# Patient Record
Sex: MALE | Race: WHITE | URBAN - METROPOLITAN AREA
[De-identification: names, ages, dates, MRNs, and addresses within clinical notes are randomized per-mention and may not be internally consistent; named-entity substitution may affect disease eponyms.]

---

## 2023-12-20 ENCOUNTER — INITIAL CONSULT (OUTPATIENT)
Dept: BARIATRICS/WEIGHT MGMT | Age: 58
End: 2023-12-20
Payer: COMMERCIAL

## 2023-12-20 VITALS
DIASTOLIC BLOOD PRESSURE: 95 MMHG | BODY MASS INDEX: 28.45 KG/M2 | WEIGHT: 177 LBS | HEART RATE: 75 BPM | SYSTOLIC BLOOD PRESSURE: 154 MMHG | HEIGHT: 66 IN

## 2023-12-20 DIAGNOSIS — K43.0 INCISIONAL HERNIA, INCARCERATED: Primary | ICD-10-CM

## 2023-12-20 PROCEDURE — 99204 OFFICE O/P NEW MOD 45 MIN: CPT | Performed by: SURGERY

## 2023-12-21 ENCOUNTER — PREP FOR PROCEDURE (OUTPATIENT)
Dept: SURGERY | Age: 58
End: 2023-12-21

## 2023-12-21 RX ORDER — LEVOFLOXACIN 5 MG/ML
500 INJECTION, SOLUTION INTRAVENOUS
OUTPATIENT
Start: 2024-02-26 | End: 2024-02-26

## 2023-12-21 RX ORDER — ACETAMINOPHEN 325 MG/1
1000 TABLET ORAL ONCE
OUTPATIENT
Start: 2023-12-21 | End: 2023-12-21

## 2023-12-21 RX ORDER — SODIUM CHLORIDE 0.9 % (FLUSH) 0.9 %
5-40 SYRINGE (ML) INJECTION PRN
OUTPATIENT
Start: 2023-12-21

## 2023-12-21 RX ORDER — SODIUM CHLORIDE 9 MG/ML
INJECTION, SOLUTION INTRAVENOUS PRN
OUTPATIENT
Start: 2023-12-21

## 2023-12-21 RX ORDER — METRONIDAZOLE 500 MG/100ML
500 INJECTION, SOLUTION INTRAVENOUS
OUTPATIENT
Start: 2024-02-26 | End: 2024-02-26

## 2023-12-21 RX ORDER — ENOXAPARIN SODIUM 100 MG/ML
40 INJECTION SUBCUTANEOUS ONCE
OUTPATIENT
Start: 2023-12-21 | End: 2023-12-21

## 2023-12-21 RX ORDER — SODIUM CHLORIDE 0.9 % (FLUSH) 0.9 %
5-40 SYRINGE (ML) INJECTION EVERY 12 HOURS SCHEDULED
OUTPATIENT
Start: 2023-12-21

## 2024-01-12 ENCOUNTER — TELEPHONE (OUTPATIENT)
Dept: BARIATRICS/WEIGHT MGMT | Age: 59
End: 2024-01-12

## 2024-01-12 NOTE — TELEPHONE ENCOUNTER
Called pt to inquire if he is available for surgery on 2/19/24 and if able to stop Remicade 6 weeks prior and post op.    Left message for pt to return a call to the office.

## 2024-01-12 NOTE — TELEPHONE ENCOUNTER
Pt returned call, update pt phone number is chart.    Pt stated that he is still currently on Remicade, he last does will be on 1/19/24. Looks like that will not be enough time to have been completed for the 2/19/24 proposed surgery date. Did let pt know that a message will be sent to provider and surgery schedule to see if they have another date.  Pt understood and will wait for follow up call.      Please advise.

## 2024-01-17 NOTE — TELEPHONE ENCOUNTER
Pt called back to confirm he is available for surgery on this date.  Pt stated last dose of Remicade will be 1/19/24.

## 2024-01-18 RX ORDER — METRONIDAZOLE 500 MG/1
TABLET ORAL
Qty: 3 TABLET | Refills: 0 | Status: SHIPPED | OUTPATIENT
Start: 2024-01-18

## 2024-01-18 RX ORDER — NEOMYCIN SULFATE 500 MG/1
TABLET ORAL
Qty: 6 TABLET | Refills: 0 | Status: SHIPPED | OUTPATIENT
Start: 2024-01-18

## 2024-01-18 RX ORDER — ONDANSETRON 4 MG/1
4 TABLET, ORALLY DISINTEGRATING ORAL 3 TIMES DAILY PRN
Qty: 3 TABLET | Refills: 0 | Status: SHIPPED | OUTPATIENT
Start: 2024-01-18

## 2024-01-18 NOTE — TELEPHONE ENCOUNTER
Patient has been scheduled for:    Procedure:Robotic versus laparoscopic redo ileocecectomy, possible open   Date:2/26/24  Time:10:30 AM  Arrival:8:30 AM  [facility]  [facility] None  Prep? 2 Day prep/ Prep 2, reviewed on phone and emailed instructions     Pre-op?PCP    Post-op Appt? 3/12/24 at 10:30 AM     Patient advised they will need a .    Orders faxed to surgery scheduling.    Instructions have been mailed/emailed to: [email or web address].

## 2024-01-30 ENCOUNTER — TELEPHONE (OUTPATIENT)
Dept: BARIATRICS/WEIGHT MGMT | Age: 59
End: 2024-01-30

## 2024-01-30 NOTE — TELEPHONE ENCOUNTER
Patient has a combo surgery with Ana Laura  2/26/24 - and lvm asking about prep for this procedure.  Please advise patient     ROBOTIC VERSUS LAPAROSCOPIC REDO ILEOCECECTOMY, POSSIBLE OPEN (HIGHER THAN NORMAL LIKELIHOOD) [[id number]]  CYSTOSCOPY BILATERAL STENT INSERTION [[id number]]  . [[id number]]  ROBOTIC INCISIONAL HERNIA REPAIR WITH BIOSYNTHETIC MESH, POSSIBLE OPEN [[id number]]

## 2024-01-31 NOTE — TELEPHONE ENCOUNTER
Left vm to confirm patient received email with prep instructions from Dr. Weiner. Instructions were reviewed on emailed to patient 1/18/24. Asked patient call office back to confirm prep instructions were received.

## 2024-02-01 NOTE — TELEPHONE ENCOUNTER
Spoke with patient. He did receive email with prep instructions. It was overlooked in his email. He now has everything he needs.

## 2024-02-13 ENCOUNTER — TELEPHONE (OUTPATIENT)
Dept: SURGERY | Age: 59
End: 2024-02-13

## 2024-02-13 NOTE — TELEPHONE ENCOUNTER
Patient called to check the status of his [place] paperwork. He would like for it to be sent back to him when it is complete.

## 2024-02-14 NOTE — TELEPHONE ENCOUNTER
[facility] paperwork completed and faxed to employer [phone], emailed to [email or web address].

## 2024-02-23 ENCOUNTER — ANESTHESIA EVENT (OUTPATIENT)
Dept: OPERATING ROOM | Age: 59
DRG: 330 | End: 2024-02-23
Payer: COMMERCIAL

## 2024-02-23 ENCOUNTER — TELEPHONE (OUTPATIENT)
Dept: SURGERY | Age: 59
End: 2024-02-23

## 2024-02-23 NOTE — TELEPHONE ENCOUNTER
Spoke with patient to confirm surgery on 2/26. Arrival time 8:30 am, 2 day prep of clear liquids, npo after midnight. Patient is going to start with some Miralax Friday night as he has an issue of not always being cleaned out enough.

## 2024-02-23 NOTE — PROGRESS NOTES
[facility] PRE-SURGICAL TESTING INSTRUCTIONS                      PRIOR TO PROCEDURE DATE:    1. PLEASE FOLLOW ANY INSTRUCTIONS GIVEN TO YOU PER YOUR SURGEON.      2. Arrange for someone to drive you home and be with you for the first 24 hours after discharge for your safety after your procedure for which you received sedation. Ensure it is someone we can share information with regarding your discharge.     NOTE: At this time ONLY 2 ADULTS may accompany you   One person ENCOURAGED to stay at hospital entire time if outpatient surgery      3. You must contact your surgeon for instructions IF:  You are taking any blood thinners, aspirin, anti-inflammatory or vitamins.  There is a change in your physical condition such as a cold, fever, rash, cuts, sores, or any other infection, especially near your surgical site.    4. Do not drink alcohol the day before or day of your procedure.  Do not use any recreational marijuana at least 24 hours or street drugs (heroin, cocaine) at minimum 5 days prior to your procedure.     5. A Pre-Surgical History and Physical MUST be completed WITHIN 30 DAYS OR LESS prior to your procedure.by your Physician or an Urgent Care        THE DAY OF YOUR PROCEDURE:  1.  Follow instructions for ARRIVAL TIME as DIRECTED BY YOUR SURGEON.     2. Enter the MAIN entrance from [facility] and follow the signs to the free Parking Garage or  Parking (offered free of charge 7 am-5pm).      3. Enter the Main Entrance of the hospital (do not enter from the lower level of the parking garage). Upon entrance, check in with the  at the surgical information desk on your LEFT.   Bring your insurance card and photo ID to register      4. DO NOT EAT ANYTHING 8 hours prior to arrival for surgery.  You may have up to 8 ounces of water 4 hours prior to your arrival for surgery.   NOTE: ALL Gastric, Bariatric & Bowel surgery patients - you MUST follow your surgeon's instructions regarding  you on the day of your procedure.    10. If you use oxygen at home, please bring your oxygen tank with you to hospital..     11. We recommend that valuable personal belongings such as cash, cell phones, e-tablets, or jewelry, be left at home during your stay. The hospital will not be responsible for valuables that are not secured in the hospital safe. However, if your insurance requires a co-pay, you may want to bring a method of payment, i.e., Check or credit card, if you wish to pay your co-pay the day of surgery.      12. If you are to stay overnight, you may bring a bag with personal items. Please have any large items you may need brought in by your family after your arrival to your hospital room.    13. If you have a Living Will or Durable Power of , please bring a copy on the day of your procedure.     How we keep you safe and work to prevent surgical site infections:   1. Health care workers should always check your ID bracelet to verify your name and birth date. You will be asked many times to state your name, date of birth, and allergies.    2. Health care workers should always clean their hands with soap or alcohol gel before providing care to you. It is okay to ask anyone if they cleaned their hands before they touch you.    3. You will be actively involved in verifying the type of procedure you are having and ensuring the correct surgical site. This will be confirmed multiple times prior to your procedure. Do NOT rich your surgery site UNLESS instructed to by your surgeon.     4. When you are in the operating room, your surgical site will be cleansed with a special soap, and in most cases, you will be given an antibiotic before the surgery begins.      What to expect AFTER your procedure?  1. Immediately following your procedure, your will be taken to the PACU for the first phase of your recovery.  Your nurse will help you recover from any potential side effects of anesthesia, such as extreme

## 2024-02-24 NOTE — ANESTHESIA PRE PROCEDURE
Department of Anesthesiology  Preprocedure Note       Name:  Richard Lyman   Age:  59 y.o.  :  1965                                          MRN:  [id number]         Date:  2024      Surgeon: Surgeon(s):  Mathieu Weiner MD Szabo, Daniel, MD Khan, Yasir, DO Szabo, Daniel, MD Barrat, Cory D, MD Khan, Yasir, DO    Procedure: Procedure(s):  ROBOTIC VERSUS LAPAROSCOPIC REDO ILEOCECECTOMY, POSSIBLE OPEN (HIGHER THAN NORMAL LIKELIHOOD)  CYSTOSCOPY BILATERAL STENT INSERTION  .  ROBOTIC INCISIONAL HERNIA REPAIR WITH BIOSYNTHETIC MESH, POSSIBLE OPEN    Medications prior to admission:   Prior to Admission medications    Medication Sig Start Date End Date Taking? Authorizing Provider   metroNIDAZOLE (FLAGYL) 500 MG tablet Take one tablet by mouth 3 times on the day prior to surgery. Take one tablet at 1pm, 2pm and 9pm. 24   Mathieu Weiner MD   neomycin (MYCIFRADIN) 500 MG tablet Take two tablets 3 times the day before surgery. Take two tablets at 1pm, two tablets at 2pm and two tablets at 9pm. 24   Mathieu Weiner MD   ondansetron (ZOFRAN-ODT) 4 MG disintegrating tablet Place 1 tablet under the tongue 3 times daily as needed for Nausea or Vomiting 24   Mathieu Weiner MD   PARoxetine (PAXIL) 10 MG tablet Take 1 tablet by mouth nightly 21   Damari Myrick MD   Colesevelam HCl (WELCHOL PO) Take by mouth PRN    Damari Myrick MD   InFLIXimab (REMICADE IV) Infuse 1 each intravenously Once every 6 weeks Every 8 wks actually    Damari Myrick MD       Current medications:    No current facility-administered medications for this encounter.     Current Outpatient Medications   Medication Sig Dispense Refill    metroNIDAZOLE (FLAGYL) 500 MG tablet Take one tablet by mouth 3 times on the day prior to surgery. Take one tablet at 1pm, 2pm and 9pm. 3 tablet 0    neomycin (MYCIFRADIN) 500 MG tablet Take two tablets 3 times the day before surgery. Take two tablets at 1pm, two  MCV 98.7 05/05/2017 04:00 PM    RDW 13.8 05/05/2017 04:00 PM     05/05/2017 04:00 PM       CMP: No results found for: \"NA\", \"K\", \"CL\", \"CO2\", \"BUN\", \"CREATININE\", \"GFRAA\", \"AGRATIO\", \"LABGLOM\", \"GLUCOSE\", \"GLU\", \"PROT\", \"CALCIUM\", \"BILITOT\", \"ALKPHOS\", \"AST\", \"ALT\"    POC Tests: No results for input(s): \"POCGLU\", \"POCNA\", \"POCK\", \"POCCL\", \"POCBUN\", \"POCHEMO\", \"POCHCT\" in the last 72 hours.    Coags: No results found for: \"PROTIME\", \"INR\", \"APTT\"    HCG (If Applicable): No results found for: \"PREGTESTUR\", \"PREGSERUM\", \"HCG\", \"HCGQUANT\"     ABGs: No results found for: \"PHART\", \"PO2ART\", \"NGS3OIS\", \"IZL1YID\", \"BEART\", \"P8NFPMTD\"     Type & Screen (If Applicable):  No results found for: \"LABABO\", \"LABRH\"    Drug/Infectious Status (If Applicable):  No results found for: \"HIV\", \"HEPCAB\"    COVID-19 Screening (If Applicable): No results found for: \"COVID19\"        Anesthesia Evaluation    Airway: Mallampati: II          Dental:          Pulmonary:normal exam  breath sounds clear to auscultation                             Cardiovascular:          ECG reviewed  Rhythm: regular  Rate: normal                    Neuro/Psych:               GI/Hepatic/Renal:            ROS comment: Crohns.   Endo/Other:                     Abdominal: normal exam            Vascular:          Other Findings:             Anesthesia Plan      general     ASA 2       Induction: intravenous.    MIPS: Postoperative opioids intended and Prophylactic antiemetics administered.  Anesthetic plan and risks discussed with patient.    Use of blood products discussed with patient whom.   Plan discussed with CRNA.    Attending anesthesiologist reviewed and agrees with Preprocedure content                Rosalba Gomez MD   2/24/2024

## 2024-02-26 ENCOUNTER — HOSPITAL ENCOUNTER (INPATIENT)
Age: 59
LOS: 4 days | Discharge: HOME | DRG: 330 | End: 2024-03-01
Attending: SURGERY | Admitting: SURGERY
Payer: COMMERCIAL

## 2024-02-26 ENCOUNTER — ANESTHESIA (OUTPATIENT)
Dept: OPERATING ROOM | Age: 59
DRG: 330 | End: 2024-02-26
Payer: COMMERCIAL

## 2024-02-26 DIAGNOSIS — K50.919 CROHN'S DISEASE WITH COMPLICATION, UNSPECIFIED GASTROINTESTINAL TRACT LOCATION (HCC): ICD-10-CM

## 2024-02-26 DIAGNOSIS — G89.18 ACUTE POST-OPERATIVE PAIN: Primary | ICD-10-CM

## 2024-02-26 DIAGNOSIS — K43.0 INCARCERATED INCISIONAL HERNIA: ICD-10-CM

## 2024-02-26 DIAGNOSIS — K91.89 ILEOCOLIC ANASTOMOTIC LEAK: ICD-10-CM

## 2024-02-26 DIAGNOSIS — N13.2 HYDRONEPHROSIS WITH RENAL AND URETERAL CALCULUS OBSTRUCTION: ICD-10-CM

## 2024-02-26 PROBLEM — Q43.8: Status: ACTIVE | Noted: 2024-02-26

## 2024-02-26 PROBLEM — K50.119 CROHN'S DISEASE OF COLON WITH COMPLICATION (HCC): Status: ACTIVE | Noted: 2024-02-26

## 2024-02-26 LAB
ABO + RH BLD: NORMAL
ALBUMIN SERPL-MCNC: 2.9 G/DL (ref 3.4–5)
ANION GAP SERPL CALCULATED.3IONS-SCNC: 11 MMOL/L (ref 3–16)
ANION GAP SERPL CALCULATED.3IONS-SCNC: 12 MMOL/L (ref 3–16)
BASOPHILS # BLD: 0 K/UL (ref 0–0.2)
BASOPHILS NFR BLD: 0.1 %
BLD GP AB SCN SERPL QL: NORMAL
BUN SERPL-MCNC: 10 MG/DL (ref 7–20)
BUN SERPL-MCNC: 11 MG/DL (ref 7–20)
CALCIUM SERPL-MCNC: 7.6 MG/DL (ref 8.3–10.6)
CALCIUM SERPL-MCNC: 9.5 MG/DL (ref 8.3–10.6)
CHLORIDE SERPL-SCNC: 100 MMOL/L (ref 99–110)
CHLORIDE SERPL-SCNC: 103 MMOL/L (ref 99–110)
CO2 SERPL-SCNC: 21 MMOL/L (ref 21–32)
CO2 SERPL-SCNC: 26 MMOL/L (ref 21–32)
CREAT SERPL-MCNC: 1.1 MG/DL (ref 0.9–1.3)
CREAT SERPL-MCNC: 1.5 MG/DL (ref 0.9–1.3)
DEPRECATED RDW RBC AUTO: 13.7 % (ref 12.4–15.4)
DEPRECATED RDW RBC AUTO: 13.7 % (ref 12.4–15.4)
EOSINOPHIL # BLD: 0 K/UL (ref 0–0.6)
EOSINOPHIL NFR BLD: 0.1 %
GFR SERPLBLD CREATININE-BSD FMLA CKD-EPI: 54 ML/MIN/{1.73_M2}
GFR SERPLBLD CREATININE-BSD FMLA CKD-EPI: >60 ML/MIN/{1.73_M2}
GLUCOSE SERPL-MCNC: 112 MG/DL (ref 70–99)
GLUCOSE SERPL-MCNC: 131 MG/DL (ref 70–99)
HCT VFR BLD AUTO: 38.4 % (ref 40.5–52.5)
HCT VFR BLD AUTO: 48.1 % (ref 40.5–52.5)
HGB BLD-MCNC: 13.5 G/DL (ref 13.5–17.5)
HGB BLD-MCNC: 16.9 G/DL (ref 13.5–17.5)
INR PPP: 1.23 (ref 0.84–1.16)
LACTATE BLDV-SCNC: 1.8 MMOL/L (ref 0.4–2)
LYMPHOCYTES # BLD: 0.9 K/UL (ref 1–5.1)
LYMPHOCYTES NFR BLD: 8.1 %
MAGNESIUM SERPL-MCNC: 1.5 MG/DL (ref 1.8–2.4)
MCH RBC QN AUTO: 32.3 PG (ref 26–34)
MCH RBC QN AUTO: 32.8 PG (ref 26–34)
MCHC RBC AUTO-ENTMCNC: 35.1 G/DL (ref 31–36)
MCHC RBC AUTO-ENTMCNC: 35.2 G/DL (ref 31–36)
MCV RBC AUTO: 92 FL (ref 80–100)
MCV RBC AUTO: 93.4 FL (ref 80–100)
MONOCYTES # BLD: 1.2 K/UL (ref 0–1.3)
MONOCYTES NFR BLD: 10.4 %
NEUTROPHILS # BLD: 9.6 K/UL (ref 1.7–7.7)
NEUTROPHILS NFR BLD: 81.3 %
NT-PROBNP SERPL-MCNC: <36 PG/ML (ref 0–124)
PHOSPHATE SERPL-MCNC: 3 MG/DL (ref 2.5–4.9)
PLATELET # BLD AUTO: 243 K/UL (ref 135–450)
PLATELET # BLD AUTO: 293 K/UL (ref 135–450)
PMV BLD AUTO: 8.1 FL (ref 5–10.5)
PMV BLD AUTO: 8.1 FL (ref 5–10.5)
POTASSIUM SERPL-SCNC: 4 MMOL/L (ref 3.5–5.1)
POTASSIUM SERPL-SCNC: 4.2 MMOL/L (ref 3.5–5.1)
PROTHROMBIN TIME: 15.5 SEC (ref 11.5–14.8)
RBC # BLD AUTO: 4.17 M/UL (ref 4.2–5.9)
RBC # BLD AUTO: 5.15 M/UL (ref 4.2–5.9)
SODIUM SERPL-SCNC: 135 MMOL/L (ref 136–145)
SODIUM SERPL-SCNC: 138 MMOL/L (ref 136–145)
WBC # BLD AUTO: 11.8 K/UL (ref 4–11)
WBC # BLD AUTO: 8.5 K/UL (ref 4–11)

## 2024-02-26 PROCEDURE — 0WQF0ZZ REPAIR ABDOMINAL WALL, OPEN APPROACH: ICD-10-PCS | Performed by: SURGERY

## 2024-02-26 PROCEDURE — 2580000003 HC RX 258

## 2024-02-26 PROCEDURE — 2709999900 HC NON-CHARGEABLE SUPPLY: Performed by: SURGERY

## 2024-02-26 PROCEDURE — 7100000001 HC PACU RECOVERY - ADDTL 15 MIN: Performed by: SURGERY

## 2024-02-26 PROCEDURE — 2500000003 HC RX 250 WO HCPCS

## 2024-02-26 PROCEDURE — 2500000003 HC RX 250 WO HCPCS: Performed by: NURSE ANESTHETIST, CERTIFIED REGISTERED

## 2024-02-26 PROCEDURE — 0DBH0ZZ EXCISION OF CECUM, OPEN APPROACH: ICD-10-PCS | Performed by: SURGERY

## 2024-02-26 PROCEDURE — 88342 IMHCHEM/IMCYTCHM 1ST ANTB: CPT

## 2024-02-26 PROCEDURE — 49596 RPR AA HRN 1ST > 10 NCR/STRN: CPT | Performed by: SURGERY

## 2024-02-26 PROCEDURE — 83735 ASSAY OF MAGNESIUM: CPT

## 2024-02-26 PROCEDURE — 88309 TISSUE EXAM BY PATHOLOGIST: CPT

## 2024-02-26 PROCEDURE — 2580000003 HC RX 258: Performed by: SURGERY

## 2024-02-26 PROCEDURE — 94761 N-INVAS EAR/PLS OXIMETRY MLT: CPT

## 2024-02-26 PROCEDURE — 3600000009 HC SURGERY ROBOT BASE: Performed by: SURGERY

## 2024-02-26 PROCEDURE — C1781 MESH (IMPLANTABLE): HCPCS | Performed by: SURGERY

## 2024-02-26 PROCEDURE — 86850 RBC ANTIBODY SCREEN: CPT

## 2024-02-26 PROCEDURE — 44205 LAP COLECTOMY PART W/ILEUM: CPT | Performed by: SURGERY

## 2024-02-26 PROCEDURE — S2900 ROBOTIC SURGICAL SYSTEM: HCPCS | Performed by: SURGERY

## 2024-02-26 PROCEDURE — 83880 ASSAY OF NATRIURETIC PEPTIDE: CPT

## 2024-02-26 PROCEDURE — 3700000001 HC ADD 15 MINUTES (ANESTHESIA): Performed by: SURGERY

## 2024-02-26 PROCEDURE — C1713 ANCHOR/SCREW BN/BN,TIS/BN: HCPCS | Performed by: SURGERY

## 2024-02-26 PROCEDURE — 83605 ASSAY OF LACTIC ACID: CPT

## 2024-02-26 PROCEDURE — 3600000019 HC SURGERY ROBOT ADDTL 15MIN: Performed by: SURGERY

## 2024-02-26 PROCEDURE — 2580000003 HC RX 258: Performed by: ANESTHESIOLOGY

## 2024-02-26 PROCEDURE — 85027 COMPLETE CBC AUTOMATED: CPT

## 2024-02-26 PROCEDURE — 3700000000 HC ANESTHESIA ATTENDED CARE: Performed by: SURGERY

## 2024-02-26 PROCEDURE — 86900 BLOOD TYPING SEROLOGIC ABO: CPT

## 2024-02-26 PROCEDURE — C9290 INJ, BUPIVACAINE LIPOSOME: HCPCS | Performed by: ANESTHESIOLOGY

## 2024-02-26 PROCEDURE — 6370000000 HC RX 637 (ALT 250 FOR IP): Performed by: SURGERY

## 2024-02-26 PROCEDURE — 2700000000 HC OXYGEN THERAPY PER DAY

## 2024-02-26 PROCEDURE — A4217 STERILE WATER/SALINE, 500 ML: HCPCS | Performed by: SURGERY

## 2024-02-26 PROCEDURE — 88341 IMHCHEM/IMCYTCHM EA ADD ANTB: CPT

## 2024-02-26 PROCEDURE — 6360000002 HC RX W HCPCS

## 2024-02-26 PROCEDURE — 6360000002 HC RX W HCPCS: Performed by: SURGERY

## 2024-02-26 PROCEDURE — 7100000000 HC PACU RECOVERY - FIRST 15 MIN: Performed by: SURGERY

## 2024-02-26 PROCEDURE — 88304 TISSUE EXAM BY PATHOLOGIST: CPT

## 2024-02-26 PROCEDURE — 6370000000 HC RX 637 (ALT 250 FOR IP)

## 2024-02-26 PROCEDURE — 6360000002 HC RX W HCPCS: Performed by: ANESTHESIOLOGY

## 2024-02-26 PROCEDURE — C1758 CATHETER, URETERAL: HCPCS | Performed by: SURGERY

## 2024-02-26 PROCEDURE — 86901 BLOOD TYPING SEROLOGIC RH(D): CPT

## 2024-02-26 PROCEDURE — 80069 RENAL FUNCTION PANEL: CPT

## 2024-02-26 PROCEDURE — 15734 MUSCLE-SKIN GRAFT TRUNK: CPT | Performed by: SURGERY

## 2024-02-26 PROCEDURE — 2720000010 HC SURG SUPPLY STERILE: Performed by: SURGERY

## 2024-02-26 PROCEDURE — 6360000002 HC RX W HCPCS: Performed by: NURSE ANESTHETIST, CERTIFIED REGISTERED

## 2024-02-26 PROCEDURE — C1769 GUIDE WIRE: HCPCS | Performed by: SURGERY

## 2024-02-26 PROCEDURE — 64488 TAP BLOCK BI INJECTION: CPT | Performed by: ANESTHESIOLOGY

## 2024-02-26 PROCEDURE — 85610 PROTHROMBIN TIME: CPT

## 2024-02-26 PROCEDURE — 1200000000 HC SEMI PRIVATE

## 2024-02-26 PROCEDURE — 85025 COMPLETE CBC W/AUTO DIFF WBC: CPT

## 2024-02-26 PROCEDURE — 2580000003 HC RX 258: Performed by: NURSE ANESTHETIST, CERTIFIED REGISTERED

## 2024-02-26 DEVICE — IMPLANTABLE DEVICE: Type: IMPLANTABLE DEVICE | Status: FUNCTIONAL

## 2024-02-26 RX ORDER — LORAZEPAM 2 MG/ML
0.5 INJECTION INTRAMUSCULAR
Status: DISCONTINUED | OUTPATIENT
Start: 2024-02-26 | End: 2024-02-26 | Stop reason: HOSPADM

## 2024-02-26 RX ORDER — SODIUM CHLORIDE 0.9 % (FLUSH) 0.9 %
5-40 SYRINGE (ML) INJECTION EVERY 12 HOURS SCHEDULED
Status: DISCONTINUED | OUTPATIENT
Start: 2024-02-26 | End: 2024-02-26 | Stop reason: HOSPADM

## 2024-02-26 RX ORDER — MAGNESIUM SULFATE IN WATER 40 MG/ML
2000 INJECTION, SOLUTION INTRAVENOUS PRN
Status: DISCONTINUED | OUTPATIENT
Start: 2024-02-26 | End: 2024-03-01 | Stop reason: HOSPADM

## 2024-02-26 RX ORDER — ONDANSETRON 4 MG/1
4 TABLET, ORALLY DISINTEGRATING ORAL EVERY 8 HOURS PRN
Status: DISCONTINUED | OUTPATIENT
Start: 2024-02-26 | End: 2024-03-01 | Stop reason: HOSPADM

## 2024-02-26 RX ORDER — ACETAMINOPHEN 500 MG
1000 TABLET ORAL EVERY 6 HOURS SCHEDULED
Status: DISCONTINUED | OUTPATIENT
Start: 2024-02-26 | End: 2024-03-01 | Stop reason: HOSPADM

## 2024-02-26 RX ORDER — SUCCINYLCHOLINE/SOD CL,ISO/PF 200MG/10ML
SYRINGE (ML) INTRAVENOUS PRN
Status: DISCONTINUED | OUTPATIENT
Start: 2024-02-26 | End: 2024-02-26 | Stop reason: SDUPTHER

## 2024-02-26 RX ORDER — HYDROMORPHONE HYDROCHLORIDE 1 MG/ML
0.5 INJECTION, SOLUTION INTRAMUSCULAR; INTRAVENOUS; SUBCUTANEOUS
Status: DISCONTINUED | OUTPATIENT
Start: 2024-02-26 | End: 2024-02-28

## 2024-02-26 RX ORDER — PAROXETINE 10 MG/1
10 TABLET, FILM COATED ORAL NIGHTLY
Status: DISCONTINUED | OUTPATIENT
Start: 2024-02-26 | End: 2024-03-01 | Stop reason: HOSPADM

## 2024-02-26 RX ORDER — METOCLOPRAMIDE HYDROCHLORIDE 5 MG/ML
10 INJECTION INTRAMUSCULAR; INTRAVENOUS
Status: COMPLETED | OUTPATIENT
Start: 2024-02-26 | End: 2024-02-26

## 2024-02-26 RX ORDER — FENTANYL CITRATE 50 UG/ML
25 INJECTION, SOLUTION INTRAMUSCULAR; INTRAVENOUS EVERY 5 MIN PRN
Status: DISCONTINUED | OUTPATIENT
Start: 2024-02-26 | End: 2024-02-26 | Stop reason: HOSPADM

## 2024-02-26 RX ORDER — MAGNESIUM HYDROXIDE 1200 MG/15ML
LIQUID ORAL CONTINUOUS PRN
Status: DISCONTINUED | OUTPATIENT
Start: 2024-02-26 | End: 2024-02-26 | Stop reason: HOSPADM

## 2024-02-26 RX ORDER — BUPIVACAINE HYDROCHLORIDE 2.5 MG/ML
INJECTION, SOLUTION INFILTRATION; PERINEURAL
Status: COMPLETED | OUTPATIENT
Start: 2024-02-26 | End: 2024-02-26

## 2024-02-26 RX ORDER — SODIUM CHLORIDE 9 MG/ML
INJECTION, SOLUTION INTRAVENOUS PRN
Status: DISCONTINUED | OUTPATIENT
Start: 2024-02-26 | End: 2024-02-26 | Stop reason: HOSPADM

## 2024-02-26 RX ORDER — POTASSIUM CHLORIDE 7.45 MG/ML
10 INJECTION INTRAVENOUS PRN
Status: DISCONTINUED | OUTPATIENT
Start: 2024-02-26 | End: 2024-02-29

## 2024-02-26 RX ORDER — OXYCODONE HYDROCHLORIDE 5 MG/1
5 TABLET ORAL EVERY 4 HOURS PRN
Status: DISCONTINUED | OUTPATIENT
Start: 2024-02-26 | End: 2024-03-01 | Stop reason: HOSPADM

## 2024-02-26 RX ORDER — HYDRALAZINE HYDROCHLORIDE 20 MG/ML
10 INJECTION INTRAMUSCULAR; INTRAVENOUS
Status: DISCONTINUED | OUTPATIENT
Start: 2024-02-26 | End: 2024-02-26 | Stop reason: HOSPADM

## 2024-02-26 RX ORDER — SODIUM CHLORIDE 0.9 % (FLUSH) 0.9 %
5-40 SYRINGE (ML) INJECTION PRN
Status: DISCONTINUED | OUTPATIENT
Start: 2024-02-26 | End: 2024-02-26 | Stop reason: HOSPADM

## 2024-02-26 RX ORDER — HYDROMORPHONE HYDROCHLORIDE 1 MG/ML
0.5 INJECTION, SOLUTION INTRAMUSCULAR; INTRAVENOUS; SUBCUTANEOUS EVERY 5 MIN PRN
Status: DISCONTINUED | OUTPATIENT
Start: 2024-02-26 | End: 2024-02-26 | Stop reason: HOSPADM

## 2024-02-26 RX ORDER — ENOXAPARIN SODIUM 100 MG/ML
40 INJECTION SUBCUTANEOUS DAILY
Status: DISCONTINUED | OUTPATIENT
Start: 2024-02-27 | End: 2024-03-01 | Stop reason: HOSPADM

## 2024-02-26 RX ORDER — ONDANSETRON 2 MG/ML
4 INJECTION INTRAMUSCULAR; INTRAVENOUS
Status: DISCONTINUED | OUTPATIENT
Start: 2024-02-26 | End: 2024-02-26 | Stop reason: HOSPADM

## 2024-02-26 RX ORDER — FENTANYL CITRATE 50 UG/ML
INJECTION, SOLUTION INTRAMUSCULAR; INTRAVENOUS PRN
Status: DISCONTINUED | OUTPATIENT
Start: 2024-02-26 | End: 2024-02-26 | Stop reason: SDUPTHER

## 2024-02-26 RX ORDER — HYDROMORPHONE HYDROCHLORIDE 2 MG/ML
INJECTION, SOLUTION INTRAMUSCULAR; INTRAVENOUS; SUBCUTANEOUS PRN
Status: DISCONTINUED | OUTPATIENT
Start: 2024-02-26 | End: 2024-02-26 | Stop reason: SDUPTHER

## 2024-02-26 RX ORDER — PHENYLEPHRINE HYDROCHLORIDE 10 MG/ML
INJECTION INTRAVENOUS PRN
Status: DISCONTINUED | OUTPATIENT
Start: 2024-02-26 | End: 2024-02-26 | Stop reason: SDUPTHER

## 2024-02-26 RX ORDER — MAGNESIUM SULFATE IN WATER 40 MG/ML
2000 INJECTION, SOLUTION INTRAVENOUS ONCE
Status: COMPLETED | OUTPATIENT
Start: 2024-02-26 | End: 2024-02-26

## 2024-02-26 RX ORDER — SODIUM CHLORIDE 0.9 % (FLUSH) 0.9 %
10 SYRINGE (ML) INJECTION PRN
Status: DISCONTINUED | OUTPATIENT
Start: 2024-02-26 | End: 2024-03-01 | Stop reason: HOSPADM

## 2024-02-26 RX ORDER — ENOXAPARIN SODIUM 100 MG/ML
40 INJECTION SUBCUTANEOUS ONCE
Status: COMPLETED | OUTPATIENT
Start: 2024-02-26 | End: 2024-02-26

## 2024-02-26 RX ORDER — METRONIDAZOLE 500 MG/100ML
500 INJECTION, SOLUTION INTRAVENOUS
Status: COMPLETED | OUTPATIENT
Start: 2024-02-26 | End: 2024-02-26

## 2024-02-26 RX ORDER — METHOCARBAMOL 750 MG/1
1500 TABLET, FILM COATED ORAL 4 TIMES DAILY
Status: DISCONTINUED | OUTPATIENT
Start: 2024-02-26 | End: 2024-02-29

## 2024-02-26 RX ORDER — ONDANSETRON 2 MG/ML
INJECTION INTRAMUSCULAR; INTRAVENOUS PRN
Status: DISCONTINUED | OUTPATIENT
Start: 2024-02-26 | End: 2024-02-26 | Stop reason: SDUPTHER

## 2024-02-26 RX ORDER — DIPHENHYDRAMINE HYDROCHLORIDE 50 MG/ML
12.5 INJECTION INTRAMUSCULAR; INTRAVENOUS
Status: DISCONTINUED | OUTPATIENT
Start: 2024-02-26 | End: 2024-03-01 | Stop reason: HOSPADM

## 2024-02-26 RX ORDER — LEVOFLOXACIN 5 MG/ML
500 INJECTION, SOLUTION INTRAVENOUS
Status: COMPLETED | OUTPATIENT
Start: 2024-02-26 | End: 2024-02-26

## 2024-02-26 RX ORDER — SODIUM CHLORIDE 0.9 % (FLUSH) 0.9 %
10 SYRINGE (ML) INJECTION EVERY 12 HOURS SCHEDULED
Status: DISCONTINUED | OUTPATIENT
Start: 2024-02-26 | End: 2024-03-01 | Stop reason: HOSPADM

## 2024-02-26 RX ORDER — SODIUM CHLORIDE, SODIUM LACTATE, POTASSIUM CHLORIDE, CALCIUM CHLORIDE 600; 310; 30; 20 MG/100ML; MG/100ML; MG/100ML; MG/100ML
INJECTION, SOLUTION INTRAVENOUS CONTINUOUS
Status: DISCONTINUED | OUTPATIENT
Start: 2024-02-26 | End: 2024-02-26 | Stop reason: HOSPADM

## 2024-02-26 RX ORDER — OXYCODONE HYDROCHLORIDE 5 MG/1
10 TABLET ORAL PRN
Status: DISCONTINUED | OUTPATIENT
Start: 2024-02-26 | End: 2024-02-26 | Stop reason: HOSPADM

## 2024-02-26 RX ORDER — PROPOFOL 10 MG/ML
INJECTION, EMULSION INTRAVENOUS PRN
Status: DISCONTINUED | OUTPATIENT
Start: 2024-02-26 | End: 2024-02-26 | Stop reason: SDUPTHER

## 2024-02-26 RX ORDER — DIPHENHYDRAMINE HYDROCHLORIDE 50 MG/ML
INJECTION INTRAMUSCULAR; INTRAVENOUS
Status: COMPLETED
Start: 2024-02-26 | End: 2024-02-26

## 2024-02-26 RX ORDER — PANTOPRAZOLE SODIUM 40 MG/1
40 TABLET, DELAYED RELEASE ORAL DAILY
Status: DISCONTINUED | OUTPATIENT
Start: 2024-02-26 | End: 2024-03-01 | Stop reason: HOSPADM

## 2024-02-26 RX ORDER — OXYCODONE HYDROCHLORIDE 5 MG/1
10 TABLET ORAL EVERY 4 HOURS PRN
Status: DISCONTINUED | OUTPATIENT
Start: 2024-02-26 | End: 2024-03-01 | Stop reason: HOSPADM

## 2024-02-26 RX ORDER — METHOCARBAMOL 100 MG/ML
INJECTION, SOLUTION INTRAMUSCULAR; INTRAVENOUS PRN
Status: DISCONTINUED | OUTPATIENT
Start: 2024-02-26 | End: 2024-02-26 | Stop reason: SDUPTHER

## 2024-02-26 RX ORDER — LIDOCAINE HYDROCHLORIDE 20 MG/ML
INJECTION, SOLUTION INTRAVENOUS PRN
Status: DISCONTINUED | OUTPATIENT
Start: 2024-02-26 | End: 2024-02-26 | Stop reason: SDUPTHER

## 2024-02-26 RX ORDER — SODIUM CHLORIDE 9 MG/ML
INJECTION, SOLUTION INTRAVENOUS CONTINUOUS PRN
Status: DISCONTINUED | OUTPATIENT
Start: 2024-02-26 | End: 2024-02-26 | Stop reason: SDUPTHER

## 2024-02-26 RX ORDER — SODIUM CHLORIDE, SODIUM LACTATE, POTASSIUM CHLORIDE, CALCIUM CHLORIDE 600; 310; 30; 20 MG/100ML; MG/100ML; MG/100ML; MG/100ML
INJECTION, SOLUTION INTRAVENOUS CONTINUOUS
Status: DISCONTINUED | OUTPATIENT
Start: 2024-02-26 | End: 2024-02-27

## 2024-02-26 RX ORDER — ROCURONIUM BROMIDE 10 MG/ML
INJECTION, SOLUTION INTRAVENOUS PRN
Status: DISCONTINUED | OUTPATIENT
Start: 2024-02-26 | End: 2024-02-26 | Stop reason: SDUPTHER

## 2024-02-26 RX ORDER — LABETALOL HYDROCHLORIDE 5 MG/ML
10 INJECTION, SOLUTION INTRAVENOUS
Status: DISCONTINUED | OUTPATIENT
Start: 2024-02-26 | End: 2024-02-26 | Stop reason: HOSPADM

## 2024-02-26 RX ORDER — ONDANSETRON 2 MG/ML
4 INJECTION INTRAMUSCULAR; INTRAVENOUS EVERY 6 HOURS PRN
Status: DISCONTINUED | OUTPATIENT
Start: 2024-02-26 | End: 2024-03-01 | Stop reason: HOSPADM

## 2024-02-26 RX ORDER — MIDAZOLAM HYDROCHLORIDE 1 MG/ML
INJECTION INTRAMUSCULAR; INTRAVENOUS PRN
Status: DISCONTINUED | OUTPATIENT
Start: 2024-02-26 | End: 2024-02-26 | Stop reason: SDUPTHER

## 2024-02-26 RX ORDER — ACETAMINOPHEN 500 MG
1000 TABLET ORAL ONCE
Status: COMPLETED | OUTPATIENT
Start: 2024-02-26 | End: 2024-02-26

## 2024-02-26 RX ORDER — SODIUM CHLORIDE, SODIUM LACTATE, POTASSIUM CHLORIDE, CALCIUM CHLORIDE 600; 310; 30; 20 MG/100ML; MG/100ML; MG/100ML; MG/100ML
INJECTION, SOLUTION INTRAVENOUS CONTINUOUS PRN
Status: DISCONTINUED | OUTPATIENT
Start: 2024-02-26 | End: 2024-02-26 | Stop reason: SDUPTHER

## 2024-02-26 RX ORDER — POTASSIUM CHLORIDE 20 MEQ/1
40 TABLET, EXTENDED RELEASE ORAL PRN
Status: DISCONTINUED | OUTPATIENT
Start: 2024-02-26 | End: 2024-02-29

## 2024-02-26 RX ORDER — OXYCODONE HYDROCHLORIDE 5 MG/1
5 TABLET ORAL PRN
Status: DISCONTINUED | OUTPATIENT
Start: 2024-02-26 | End: 2024-02-26 | Stop reason: HOSPADM

## 2024-02-26 RX ORDER — HYDROMORPHONE HYDROCHLORIDE 1 MG/ML
0.25 INJECTION, SOLUTION INTRAMUSCULAR; INTRAVENOUS; SUBCUTANEOUS
Status: DISCONTINUED | OUTPATIENT
Start: 2024-02-26 | End: 2024-02-28

## 2024-02-26 RX ORDER — SODIUM CHLORIDE 9 MG/ML
INJECTION, SOLUTION INTRAVENOUS PRN
Status: DISCONTINUED | OUTPATIENT
Start: 2024-02-26 | End: 2024-03-01 | Stop reason: HOSPADM

## 2024-02-26 RX ORDER — MEPERIDINE HYDROCHLORIDE 25 MG/ML
12.5 INJECTION INTRAMUSCULAR; INTRAVENOUS; SUBCUTANEOUS EVERY 5 MIN PRN
Status: DISCONTINUED | OUTPATIENT
Start: 2024-02-26 | End: 2024-02-26 | Stop reason: HOSPADM

## 2024-02-26 RX ADMIN — SODIUM CHLORIDE, SODIUM LACTATE, POTASSIUM CHLORIDE, AND CALCIUM CHLORIDE: .6; .31; .03; .02 INJECTION, SOLUTION INTRAVENOUS at 12:16

## 2024-02-26 RX ADMIN — ROCURONIUM BROMIDE 50 MG: 10 INJECTION, SOLUTION INTRAVENOUS at 11:30

## 2024-02-26 RX ADMIN — FENTANYL CITRATE 50 MCG: 50 INJECTION, SOLUTION INTRAMUSCULAR; INTRAVENOUS at 11:23

## 2024-02-26 RX ADMIN — PANTOPRAZOLE SODIUM 40 MG: 40 TABLET, DELAYED RELEASE ORAL at 18:15

## 2024-02-26 RX ADMIN — PHENYLEPHRINE HYDROCHLORIDE 100 MCG: 10 INJECTION, SOLUTION INTRAVENOUS at 12:59

## 2024-02-26 RX ADMIN — FENTANYL CITRATE 25 MCG: 50 INJECTION INTRAMUSCULAR; INTRAVENOUS at 17:00

## 2024-02-26 RX ADMIN — METOCLOPRAMIDE HYDROCHLORIDE 10 MG: 5 INJECTION INTRAMUSCULAR; INTRAVENOUS at 16:59

## 2024-02-26 RX ADMIN — DIPHENHYDRAMINE HYDROCHLORIDE 12.5 MG: 50 INJECTION INTRAMUSCULAR; INTRAVENOUS at 16:56

## 2024-02-26 RX ADMIN — METRONIDAZOLE 500 MG: 500 INJECTION, SOLUTION INTRAVENOUS at 11:03

## 2024-02-26 RX ADMIN — PAROXETINE HYDROCHLORIDE 10 MG: 10 TABLET, FILM COATED ORAL at 21:48

## 2024-02-26 RX ADMIN — PHENYLEPHRINE HYDROCHLORIDE 100 MCG: 10 INJECTION, SOLUTION INTRAVENOUS at 11:50

## 2024-02-26 RX ADMIN — PHENYLEPHRINE HYDROCHLORIDE 100 MCG: 10 INJECTION, SOLUTION INTRAVENOUS at 11:54

## 2024-02-26 RX ADMIN — SODIUM CHLORIDE, SODIUM LACTATE, POTASSIUM CHLORIDE, AND CALCIUM CHLORIDE: .6; .31; .03; .02 INJECTION, SOLUTION INTRAVENOUS at 10:56

## 2024-02-26 RX ADMIN — HYDROMORPHONE HYDROCHLORIDE 0.5 MG: 2 INJECTION, SOLUTION INTRAMUSCULAR; INTRAVENOUS; SUBCUTANEOUS at 14:54

## 2024-02-26 RX ADMIN — SODIUM CHLORIDE, POTASSIUM CHLORIDE, SODIUM LACTATE AND CALCIUM CHLORIDE: 600; 310; 30; 20 INJECTION, SOLUTION INTRAVENOUS at 17:38

## 2024-02-26 RX ADMIN — ACETAMINOPHEN 1000 MG: 500 TABLET ORAL at 21:48

## 2024-02-26 RX ADMIN — ROCURONIUM BROMIDE 45 MG: 10 INJECTION, SOLUTION INTRAVENOUS at 11:05

## 2024-02-26 RX ADMIN — SUGAMMADEX 100 MG: 100 INJECTION, SOLUTION INTRAVENOUS at 16:01

## 2024-02-26 RX ADMIN — MAGNESIUM SULFATE HEPTAHYDRATE 2000 MG: 40 INJECTION, SOLUTION INTRAVENOUS at 18:15

## 2024-02-26 RX ADMIN — PROPOFOL 160 MG: 10 INJECTION, EMULSION INTRAVENOUS at 11:01

## 2024-02-26 RX ADMIN — HYDROMORPHONE HYDROCHLORIDE 0.5 MG: 2 INJECTION, SOLUTION INTRAMUSCULAR; INTRAVENOUS; SUBCUTANEOUS at 15:44

## 2024-02-26 RX ADMIN — PHENYLEPHRINE HYDROCHLORIDE 100 MCG: 10 INJECTION, SOLUTION INTRAVENOUS at 11:15

## 2024-02-26 RX ADMIN — METHOCARBAMOL 1500 MG: 750 TABLET ORAL at 21:48

## 2024-02-26 RX ADMIN — OXYCODONE 10 MG: 5 TABLET ORAL at 20:39

## 2024-02-26 RX ADMIN — METHOCARBAMOL 1000 MG: 100 INJECTION INTRAMUSCULAR; INTRAVENOUS at 11:28

## 2024-02-26 RX ADMIN — BUPIVACAINE HYDROCHLORIDE 40 ML: 2.5 INJECTION, SOLUTION INFILTRATION; PERINEURAL at 15:58

## 2024-02-26 RX ADMIN — SODIUM CHLORIDE, POTASSIUM CHLORIDE, SODIUM LACTATE AND CALCIUM CHLORIDE: 600; 310; 30; 20 INJECTION, SOLUTION INTRAVENOUS at 09:31

## 2024-02-26 RX ADMIN — HYDROMORPHONE HYDROCHLORIDE 0.5 MG: 1 INJECTION, SOLUTION INTRAMUSCULAR; INTRAVENOUS; SUBCUTANEOUS at 17:00

## 2024-02-26 RX ADMIN — SODIUM CHLORIDE 2000 MG: 900 INJECTION INTRAVENOUS at 18:13

## 2024-02-26 RX ADMIN — BUPIVACAINE 20 ML: 13.3 INJECTION, SUSPENSION, LIPOSOMAL INFILTRATION at 15:58

## 2024-02-26 RX ADMIN — DEXMEDETOMIDINE HYDROCHLORIDE 6 MCG: 100 INJECTION, SOLUTION INTRAVENOUS at 11:26

## 2024-02-26 RX ADMIN — LEVOFLOXACIN 500 MG: 5 INJECTION, SOLUTION INTRAVENOUS at 11:12

## 2024-02-26 RX ADMIN — ONDANSETRON 4 MG: 2 INJECTION INTRAMUSCULAR; INTRAVENOUS at 15:57

## 2024-02-26 RX ADMIN — SODIUM CHLORIDE, POTASSIUM CHLORIDE, SODIUM LACTATE AND CALCIUM CHLORIDE: 600; 310; 30; 20 INJECTION, SOLUTION INTRAVENOUS at 22:27

## 2024-02-26 RX ADMIN — ONDANSETRON 4 MG: 2 INJECTION INTRAMUSCULAR; INTRAVENOUS at 11:21

## 2024-02-26 RX ADMIN — LIDOCAINE HYDROCHLORIDE 80 MG: 20 INJECTION, SOLUTION INTRAVENOUS at 11:01

## 2024-02-26 RX ADMIN — Medication 120 MG: at 11:01

## 2024-02-26 RX ADMIN — SODIUM CHLORIDE, POTASSIUM CHLORIDE, SODIUM LACTATE AND CALCIUM CHLORIDE: 600; 310; 30; 20 INJECTION, SOLUTION INTRAVENOUS at 13:15

## 2024-02-26 RX ADMIN — ROCURONIUM BROMIDE 5 MG: 10 INJECTION, SOLUTION INTRAVENOUS at 11:01

## 2024-02-26 RX ADMIN — MIDAZOLAM HYDROCHLORIDE 2 MG: 2 INJECTION, SOLUTION INTRAMUSCULAR; INTRAVENOUS at 10:54

## 2024-02-26 RX ADMIN — FENTANYL CITRATE 50 MCG: 50 INJECTION, SOLUTION INTRAMUSCULAR; INTRAVENOUS at 10:58

## 2024-02-26 RX ADMIN — ROCURONIUM BROMIDE 30 MG: 10 INJECTION, SOLUTION INTRAVENOUS at 12:54

## 2024-02-26 RX ADMIN — SODIUM CHLORIDE: 9 INJECTION, SOLUTION INTRAVENOUS at 12:59

## 2024-02-26 RX ADMIN — HYDROMORPHONE HYDROCHLORIDE 0.5 MG: 2 INJECTION, SOLUTION INTRAMUSCULAR; INTRAVENOUS; SUBCUTANEOUS at 16:13

## 2024-02-26 RX ADMIN — PHENYLEPHRINE HYDROCHLORIDE 100 MCG: 10 INJECTION, SOLUTION INTRAVENOUS at 15:02

## 2024-02-26 RX ADMIN — SUGAMMADEX 100 MG: 100 INJECTION, SOLUTION INTRAVENOUS at 16:02

## 2024-02-26 RX ADMIN — PHENYLEPHRINE HYDROCHLORIDE 100 MCG: 10 INJECTION, SOLUTION INTRAVENOUS at 15:20

## 2024-02-26 RX ADMIN — ROCURONIUM BROMIDE 20 MG: 10 INJECTION, SOLUTION INTRAVENOUS at 14:07

## 2024-02-26 RX ADMIN — METHOCARBAMOL 1500 MG: 750 TABLET ORAL at 18:15

## 2024-02-26 RX ADMIN — ENOXAPARIN SODIUM 40 MG: 100 INJECTION SUBCUTANEOUS at 09:31

## 2024-02-26 RX ADMIN — PROPOFOL 50 MG: 10 INJECTION, EMULSION INTRAVENOUS at 11:25

## 2024-02-26 RX ADMIN — SODIUM CHLORIDE, POTASSIUM CHLORIDE, SODIUM LACTATE AND CALCIUM CHLORIDE: 600; 310; 30; 20 INJECTION, SOLUTION INTRAVENOUS at 12:16

## 2024-02-26 RX ADMIN — ACETAMINOPHEN 1000 MG: 500 TABLET ORAL at 09:32

## 2024-02-26 ASSESSMENT — PAIN DESCRIPTION - ORIENTATION
ORIENTATION: MID
ORIENTATION: MID

## 2024-02-26 ASSESSMENT — PAIN SCALES - GENERAL
PAINLEVEL_OUTOF10: 7
PAINLEVEL_OUTOF10: 7
PAINLEVEL_OUTOF10: 0
PAINLEVEL_OUTOF10: 2
PAINLEVEL_OUTOF10: 4
PAINLEVEL_OUTOF10: 2

## 2024-02-26 ASSESSMENT — PAIN DESCRIPTION - DESCRIPTORS
DESCRIPTORS: DISCOMFORT
DESCRIPTORS: ACHING;SORE

## 2024-02-26 ASSESSMENT — PAIN DESCRIPTION - LOCATION
LOCATION: ABDOMEN

## 2024-02-26 ASSESSMENT — PAIN - FUNCTIONAL ASSESSMENT
PAIN_FUNCTIONAL_ASSESSMENT: ACTIVITIES ARE NOT PREVENTED
PAIN_FUNCTIONAL_ASSESSMENT: ACTIVITIES ARE NOT PREVENTED

## 2024-02-26 ASSESSMENT — PAIN DESCRIPTION - ONSET: ONSET: ON-GOING

## 2024-02-26 ASSESSMENT — PAIN SCALES - WONG BAKER: WONGBAKER_NUMERICALRESPONSE: 0

## 2024-02-26 ASSESSMENT — PAIN DESCRIPTION - PAIN TYPE: TYPE: SURGICAL PAIN

## 2024-02-26 ASSESSMENT — PAIN DESCRIPTION - FREQUENCY: FREQUENCY: CONTINUOUS

## 2024-02-26 NOTE — H&P
PRE-OP/PRE-PROCEDURE H&P    Visit Date: 2/26/2024    History:     Richard Lyman is a 59 y.o. male who presents today for procedure. See my/PCP/oncologist office notes for indications and details.    Patient Active Problem List:     Iron deficiency anemia due to chronic blood loss     Iron malabsorption         Current Facility-Administered Medications:     lactated ringers IV soln infusion, , IntraVENous, Continuous, Clint Wolfe MD    sodium chloride flush 0.9 % injection 5-40 mL, 5-40 mL, IntraVENous, 2 times per day, Mathieu Weiner MD    sodium chloride flush 0.9 % injection 5-40 mL, 5-40 mL, IntraVENous, PRN, Mathieu Weiner MD    0.9 % sodium chloride infusion, , IntraVENous, PRN, Mathieu Weiner MD    metroNIDAZOLE (FLAGYL) 500 mg in 0.9% NaCl 100 mL IVPB premix, 500 mg, IntraVENous, On Call to OR **AND** levoFLOXacin (LEVAQUIN) 500 MG/100ML infusion 500 mg, 500 mg, IntraVENous, On Call to OR, Mathieu Weiner MD    acetaminophen (TYLENOL) tablet 1,000 mg, 1,000 mg, Oral, Once, Mathieu Weiner MD    enoxaparin (LOVENOX) injection 40 mg, 40 mg, SubCUTAneous, Once, Mathieu Wiener MD  Prior to Admission medications    Medication Sig Start Date End Date Taking? Authorizing Provider   metroNIDAZOLE (FLAGYL) 500 MG tablet Take one tablet by mouth 3 times on the day prior to surgery. Take one tablet at 1pm, 2pm and 9pm. 1/18/24   Mathieu Weiner MD   neomycin (MYCIFRADIN) 500 MG tablet Take two tablets 3 times the day before surgery. Take two tablets at 1pm, two tablets at 2pm and two tablets at 9pm. 1/18/24   Mathieu Weiner MD   ondansetron (ZOFRAN-ODT) 4 MG disintegrating tablet Place 1 tablet under the tongue 3 times daily as needed for Nausea or Vomiting 1/18/24   Mathieu Weiner MD   PARoxetine (PAXIL) 10 MG tablet Take 1 tablet by mouth nightly 6/16/21   ProviderDamari MD   Colesevelam HCl (WELCHOL PO) Take by mouth PRN    Provider, MD Damari   InFLIXimab (REMICADE IV) Infuse 1 each

## 2024-02-26 NOTE — BRIEF OP NOTE
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  INCISIONAL HERNA REPAIR WITH RETRORECTUS BIOSYNTHETIC MESH PLACEMENT AND DEBRIDEMET OF ABDOMINAL WALL MEASURING 23x9 CENTIMETERS    Surgeon(s):  Mathieu Weiner MD Barrat, Cory D, MD Khan, Yasir, DO    Assistant:  Surgical Assistant: Marques Cabezas  Resident: Flaca Aceves DO; Nel Suresh MD    Anesthesia: General    Estimated Blood Loss (mL): Minimal    Complications: None    Specimens:   ID Type Source Tests Collected by Time Destination   A : Redo ileocecectomy Tissue Tissue SURGICAL PATHOLOGY Mathieu Weiner MD 2/26/2024 1236    B : Abdominal wall with skin and subcutaneous tissue Tissue Tissue SURGICAL PATHOLOGY Cyrus Samano DO 2/26/2024 1419        Implants:  * No implants in log *      Drains:   Closed/Suction Drain Left Abdomen;LLQ Bulb (Active)       Negative Pressure Wound Therapy Abdomen (Active)       NG/OG/NJ/NE Tube Orogastric 18 fr Center mouth (Active)       Urinary Catheter 02/26/24 Austin (Active)       Findings: multiple adhesions from multiple intraabdominal surgeries and inflammatory Crohn's disease     This procedure was not performed to treat colon cancer through

## 2024-02-26 NOTE — PROGRESS NOTES
Patient Richard Lyman to room 5304 from PACU. Patient is A&O x 4. VSS. Patient oriented to the room all safety measures in place. Patient given IS and SCDs at this time. Admission orders released and patient 4 eyes completed. Admission documentation completed. No other needs are noted at this time.    [x] Bed alarm on and cord plugged into wall  [x] Bed in lowest position  [x] Call light and bedside table within reach  [x] Patient educated on all safety measures  [x]Oxygen connected to wall (if applicable)     Nurse 1 Esignature: Electronically signed by Sundar De La Cruz, RN on 2/26/24 at 6:41 PM EST  Nurse 2 Esignature: Electronically signed by Ashok Gilliam RN on 2/26/24 at 6:41 PM EST

## 2024-02-26 NOTE — PROGRESS NOTES
4 Eyes Skin Assessment     NAME:  Richard Lyman  YOB: 1965  MEDICAL RECORD NUMBER:  [id number]    The patient is being assessed for  Post-Op Surgical    I agree that at least one RN has performed a thorough Head to Toe Skin Assessment on the patient. ALL assessment sites listed below have been assessed.      Areas assessed by both nurses:    Head, Face, Ears, Shoulders, Back, Chest, Arms, Elbows, Hands, Sacrum. Buttock, Coccyx, Ischium, Legs. Feet and Heels, and Under Medical Devices         Does the Patient have a Wound? No noted wound(s)       Carl Prevention initiated by RN: No  Wound Care Orders initiated by RN: No    Pressure Injury (Stage 3,4, Unstageable, DTI, NWPT, and Complex wounds) if present, place Wound referral order by RN under : No    New Ostomies, if present place, Ostomy referral order under : No     Nurse 1 eSignature: Electronically signed by Sundar De La Cruz RN on 2/26/24 at 6:39 PM EST    **SHARE this note so that the co-signing nurse can place an eSignature**    Nurse 2 eSignature: Electronically signed by Ashok Gilliam RN on 2/26/24 at 6:40 PM EST

## 2024-02-26 NOTE — PLAN OF CARE
Problem: Discharge Planning  Goal: Discharge to home or other facility with appropriate resources  Outcome: Progressing  Flowsheets (Taken 2/26/2024 1747)  Discharge to home or other facility with appropriate resources:   Identify barriers to discharge with patient and caregiver   Arrange for needed discharge resources and transportation as appropriate     Problem: Pain  Goal: Verbalizes/displays adequate comfort level or baseline comfort level  Outcome: Progressing  Flowsheets (Taken 2/26/2024 1747)  Verbalizes/displays adequate comfort level or baseline comfort level:   Encourage patient to monitor pain and request assistance   Assess pain using appropriate pain scale     Problem: Safety - Adult  Goal: Free from fall injury  Outcome: Progressing  Flowsheets (Taken 2/26/2024 1747)  Free From Fall Injury: Instruct family/caregiver on patient safety     Problem: ABCDS Injury Assessment  Goal: Absence of physical injury  Outcome: Progressing  Flowsheets (Taken 2/26/2024 1747)  Absence of Physical Injury: Implement safety measures based on patient assessment

## 2024-02-26 NOTE — H&P
Update History & Physical    The patient's History and Physical  was reviewed with the patient and I examined the patient. There was no change. The surgical site was confirmed by the patient and me. Patient and the family had additional questions about the ureter tents placement intra-op. All questions were answered.      Plan: The risks, benefits, expected outcome, and alternative to the recommended procedure have been discussed with the patient. Patient understands and wants to proceed with the procedure.     Electronically signed by Nel Suresh MD on 2/26/2024 at 10:00 AM

## 2024-02-26 NOTE — PROGRESS NOTES
Pt received from OR to PACU # 13 via bed.     Post: Procedure(s):  REDO ILEOCECECTOMY  .  INCISIONAL HERNA REPAIR WITH RETRORECTUS BIOSYNTHETIC MESH PLACEMENT AND DEBRIDEMET OF ABDOMINAL WALL MEASURING 23x9 CENTIMETERS     Report received from OR RNJESUS CRNA, ISIDRO Rivera CRNA and Resident.    Per report pt received over 5L IVF and only had 200 ml urine.    Respirations reg and easy.  Pt is unresponsive.       Attached to PACU monitoring system. Alarms and parameters set    Pain none noted and no complaints of nausea.

## 2024-02-26 NOTE — PROGRESS NOTES
PACU Transfer to 5304    Vitals:    02/26/24 1715   BP: 112/69   Pulse: 85   Resp: 13   Temp: 97.8 °F (36.6 °C)   SpO2: 95%         Intake/Output Summary (Last 24 hours) at 2/26/2024 1721  Last data filed at 2/26/2024 1700  Gross per 24 hour   Intake 5000 ml   Output 590 ml   Net 4410 ml       Pain assessment:  present - adequately treated  Pain Level: 4    Patient transferred via bed per Bo to care of 5S RN.

## 2024-02-26 NOTE — ANESTHESIA PROCEDURE NOTES
Peripheral Block    Patient location during procedure: OR  Reason for block: post-op pain management and at surgeon's request  Start time: 2/26/2024 3:58 PM  End time: 2/26/2024 4:05 PM  Staffing  Performed: anesthesiologist   Anesthesiologist: Xavi Jansen MD  Performed by: Xavi Jansen MD  Authorized by: Rosalba Gomez MD    Preanesthetic Checklist  Completed: patient identified, IV checked, site marked, risks and benefits discussed, surgical/procedural consents, equipment checked, pre-op evaluation, timeout performed, anesthesia consent given, oxygen available, monitors applied/VS acknowledged, fire risk safety assessment completed and verbalized and blood product R/B/A discussed and consented  Peripheral Block   Patient position: supine  Prep: ChloraPrep  Provider prep: mask and sterile gloves  Patient monitoring: cardiac monitor, continuous pulse ox, continuous capnometry, frequent blood pressure checks, IV access, responsive to questions and oxygen  Block type: TAP and Rectus sheath  Laterality: bilateral  Injection technique: single-shot  Guidance: ultrasound guided    Needle   Needle type: insulated echogenic nerve stimulator needle   Needle gauge: 22 G  Needle localization: ultrasound guidance  Needle length: 8 cm  Assessment   Injection assessment: negative aspiration for heme, no paresthesia on injection, local visualized surrounding nerve on ultrasound and no intravascular symptoms  Paresthesia pain: none  Slow fractionated injection: yes  Hemodynamics: stable  Outcomes: uncomplicated and patient tolerated procedure well    Additional Notes  15 ml per block. Bilateral TAP and bilateral rectus sheath. Each block with 10 ml 0.25% bupivacaine + 5 ml exparel. No complications.  Medications Administered  BUPivacaine liposome (EXPAREL) injection 1.3% - Perineural   20 mL - 2/26/2024 3:58:00 PM  BUPivacaine (MARCAINE) injection 0.25% - Perineural   40 mL - 2/26/2024 3:58:00 PM

## 2024-02-27 LAB
ALBUMIN SERPL-MCNC: 3.1 G/DL (ref 3.4–5)
ALBUMIN/GLOB SERPL: 1.1 {RATIO} (ref 1.1–2.2)
ALP SERPL-CCNC: 44 U/L (ref 40–129)
ALT SERPL-CCNC: 21 U/L (ref 10–40)
ANION GAP SERPL CALCULATED.3IONS-SCNC: 12 MMOL/L (ref 3–16)
AST SERPL-CCNC: 23 U/L (ref 15–37)
BASOPHILS # BLD: 0 K/UL (ref 0–0.2)
BASOPHILS NFR BLD: 0.3 %
BILIRUB SERPL-MCNC: 1.1 MG/DL (ref 0–1)
BUN SERPL-MCNC: 11 MG/DL (ref 7–20)
CALCIUM SERPL-MCNC: 7.8 MG/DL (ref 8.3–10.6)
CHLORIDE SERPL-SCNC: 103 MMOL/L (ref 99–110)
CO2 SERPL-SCNC: 21 MMOL/L (ref 21–32)
CREAT SERPL-MCNC: 1.3 MG/DL (ref 0.9–1.3)
DEPRECATED RDW RBC AUTO: 14.2 % (ref 12.4–15.4)
EOSINOPHIL # BLD: 0 K/UL (ref 0–0.6)
EOSINOPHIL NFR BLD: 0 %
GFR SERPLBLD CREATININE-BSD FMLA CKD-EPI: >60 ML/MIN/{1.73_M2}
GLUCOSE SERPL-MCNC: 116 MG/DL (ref 70–99)
HCT VFR BLD AUTO: 39.6 % (ref 40.5–52.5)
HGB BLD-MCNC: 13.7 G/DL (ref 13.5–17.5)
LYMPHOCYTES # BLD: 1.3 K/UL (ref 1–5.1)
LYMPHOCYTES NFR BLD: 11.8 %
MAGNESIUM SERPL-MCNC: 2 MG/DL (ref 1.8–2.4)
MCH RBC QN AUTO: 33.3 PG (ref 26–34)
MCHC RBC AUTO-ENTMCNC: 34.6 G/DL (ref 31–36)
MCV RBC AUTO: 96.3 FL (ref 80–100)
MONOCYTES # BLD: 1.4 K/UL (ref 0–1.3)
MONOCYTES NFR BLD: 13.6 %
NEUTROPHILS # BLD: 7.9 K/UL (ref 1.7–7.7)
NEUTROPHILS NFR BLD: 74.3 %
PHOSPHATE SERPL-MCNC: 3.7 MG/DL (ref 2.5–4.9)
PLATELET # BLD AUTO: 235 K/UL (ref 135–450)
PMV BLD AUTO: 8.5 FL (ref 5–10.5)
POTASSIUM SERPL-SCNC: 4.3 MMOL/L (ref 3.5–5.1)
PROT SERPL-MCNC: 5.8 G/DL (ref 6.4–8.2)
RBC # BLD AUTO: 4.12 M/UL (ref 4.2–5.9)
SODIUM SERPL-SCNC: 136 MMOL/L (ref 136–145)
WBC # BLD AUTO: 10.6 K/UL (ref 4–11)

## 2024-02-27 PROCEDURE — 2580000003 HC RX 258: Performed by: SURGERY

## 2024-02-27 PROCEDURE — 94799 UNLISTED PULMONARY SVC/PX: CPT

## 2024-02-27 PROCEDURE — 36415 COLL VENOUS BLD VENIPUNCTURE: CPT

## 2024-02-27 PROCEDURE — 6370000000 HC RX 637 (ALT 250 FOR IP)

## 2024-02-27 PROCEDURE — 6360000002 HC RX W HCPCS: Performed by: SURGERY

## 2024-02-27 PROCEDURE — 94150 VITAL CAPACITY TEST: CPT

## 2024-02-27 PROCEDURE — 85025 COMPLETE CBC W/AUTO DIFF WBC: CPT

## 2024-02-27 PROCEDURE — 84100 ASSAY OF PHOSPHORUS: CPT

## 2024-02-27 PROCEDURE — 1200000000 HC SEMI PRIVATE

## 2024-02-27 PROCEDURE — 80053 COMPREHEN METABOLIC PANEL: CPT

## 2024-02-27 PROCEDURE — 83735 ASSAY OF MAGNESIUM: CPT

## 2024-02-27 PROCEDURE — 99024 POSTOP FOLLOW-UP VISIT: CPT | Performed by: SURGERY

## 2024-02-27 PROCEDURE — 6370000000 HC RX 637 (ALT 250 FOR IP): Performed by: SURGERY

## 2024-02-27 PROCEDURE — 2580000003 HC RX 258

## 2024-02-27 RX ORDER — SODIUM CHLORIDE, SODIUM LACTATE, POTASSIUM CHLORIDE, CALCIUM CHLORIDE 600; 310; 30; 20 MG/100ML; MG/100ML; MG/100ML; MG/100ML
INJECTION, SOLUTION INTRAVENOUS CONTINUOUS
Status: DISCONTINUED | OUTPATIENT
Start: 2024-02-27 | End: 2024-02-28

## 2024-02-27 RX ADMIN — PANTOPRAZOLE SODIUM 40 MG: 40 TABLET, DELAYED RELEASE ORAL at 08:08

## 2024-02-27 RX ADMIN — OXYCODONE 10 MG: 5 TABLET ORAL at 19:28

## 2024-02-27 RX ADMIN — METHOCARBAMOL 1500 MG: 750 TABLET ORAL at 13:44

## 2024-02-27 RX ADMIN — ACETAMINOPHEN 1000 MG: 500 TABLET ORAL at 12:12

## 2024-02-27 RX ADMIN — SODIUM CHLORIDE, PRESERVATIVE FREE 10 ML: 5 INJECTION INTRAVENOUS at 21:21

## 2024-02-27 RX ADMIN — OXYCODONE 5 MG: 5 TABLET ORAL at 01:23

## 2024-02-27 RX ADMIN — METHOCARBAMOL 1500 MG: 750 TABLET ORAL at 18:09

## 2024-02-27 RX ADMIN — METHOCARBAMOL 1500 MG: 750 TABLET ORAL at 08:08

## 2024-02-27 RX ADMIN — OXYCODONE 10 MG: 5 TABLET ORAL at 12:12

## 2024-02-27 RX ADMIN — SODIUM CHLORIDE 2000 MG: 900 INJECTION INTRAVENOUS at 01:08

## 2024-02-27 RX ADMIN — OXYCODONE 10 MG: 5 TABLET ORAL at 06:53

## 2024-02-27 RX ADMIN — HYDROMORPHONE HYDROCHLORIDE 0.25 MG: 1 INJECTION, SOLUTION INTRAMUSCULAR; INTRAVENOUS; SUBCUTANEOUS at 21:20

## 2024-02-27 RX ADMIN — ACETAMINOPHEN 1000 MG: 500 TABLET ORAL at 06:49

## 2024-02-27 RX ADMIN — ACETAMINOPHEN 1000 MG: 500 TABLET ORAL at 01:23

## 2024-02-27 RX ADMIN — METHOCARBAMOL 1500 MG: 750 TABLET ORAL at 21:19

## 2024-02-27 RX ADMIN — SODIUM CHLORIDE, POTASSIUM CHLORIDE, SODIUM LACTATE AND CALCIUM CHLORIDE: 600; 310; 30; 20 INJECTION, SOLUTION INTRAVENOUS at 06:48

## 2024-02-27 RX ADMIN — ENOXAPARIN SODIUM 40 MG: 100 INJECTION SUBCUTANEOUS at 08:07

## 2024-02-27 RX ADMIN — PAROXETINE HYDROCHLORIDE 10 MG: 10 TABLET, FILM COATED ORAL at 21:20

## 2024-02-27 ASSESSMENT — PAIN DESCRIPTION - ORIENTATION
ORIENTATION: MID

## 2024-02-27 ASSESSMENT — PAIN - FUNCTIONAL ASSESSMENT
PAIN_FUNCTIONAL_ASSESSMENT: PREVENTS OR INTERFERES SOME ACTIVE ACTIVITIES AND ADLS
PAIN_FUNCTIONAL_ASSESSMENT: ACTIVITIES ARE NOT PREVENTED

## 2024-02-27 ASSESSMENT — PAIN SCALES - GENERAL
PAINLEVEL_OUTOF10: 4
PAINLEVEL_OUTOF10: 2
PAINLEVEL_OUTOF10: 5
PAINLEVEL_OUTOF10: 3
PAINLEVEL_OUTOF10: 3
PAINLEVEL_OUTOF10: 4
PAINLEVEL_OUTOF10: 2
PAINLEVEL_OUTOF10: 3
PAINLEVEL_OUTOF10: 5
PAINLEVEL_OUTOF10: 4
PAINLEVEL_OUTOF10: 8
PAINLEVEL_OUTOF10: 2
PAINLEVEL_OUTOF10: 5
PAINLEVEL_OUTOF10: 3
PAINLEVEL_OUTOF10: 7
PAINLEVEL_OUTOF10: 7

## 2024-02-27 ASSESSMENT — PAIN DESCRIPTION - LOCATION
LOCATION: ABDOMEN

## 2024-02-27 ASSESSMENT — PAIN SCALES - WONG BAKER
WONGBAKER_NUMERICALRESPONSE: 2

## 2024-02-27 ASSESSMENT — PAIN DESCRIPTION - PAIN TYPE: TYPE: SURGICAL PAIN

## 2024-02-27 ASSESSMENT — PAIN DESCRIPTION - DESCRIPTORS
DESCRIPTORS: ACHING
DESCRIPTORS: DISCOMFORT
DESCRIPTORS: ACHING;SORE
DESCRIPTORS: SORE
DESCRIPTORS: DISCOMFORT

## 2024-02-27 ASSESSMENT — PAIN DESCRIPTION - FREQUENCY: FREQUENCY: CONTINUOUS

## 2024-02-27 ASSESSMENT — PAIN DESCRIPTION - ONSET: ONSET: ON-GOING

## 2024-02-27 NOTE — PROGRESS NOTES
Norman shepherd @ 0945.     Electronically signed by Sundar De La Cruz RN on 2/27/2024 at 9:47 AM

## 2024-02-27 NOTE — PROGRESS NOTES
Department of Surgery  Daily Progress Note    CC: high grade dysplasia of ileocolic anastomosis    Procedure(s) Performed: redo ileocecetomy, incisional hernia repair with retrorectus biosynthetic mesh placement and debridement of abdominal wall measuring 23x9 cm.     Subjective:   No acute events overnight. Afebrile and hemodynamically stable. Pain fairly well controled. Denies nausea or vomiting. No gas or bowel movement. Ambulated once.     Objective:  Exam:  Vitals:    02/26/24 2230 02/27/24 0123 02/27/24 0153 02/27/24 0432   BP: 114/75   118/82   Pulse: 92   88   Resp: 19 19 19 19   Temp: 97.9 °F (36.6 °C)   98.1 °F (36.7 °C)   TempSrc: Axillary   Oral   SpO2: 92%   94%   Weight:       Height:           General appearance: alert, no acute distress  Chest/Lungs: No adventitious breath sounds. No increased work of breathing.   Cardiovascular: RRR  Abdomen: soft, appropriately tender, non-distended. Prevena with good seal. CAROL drain w/ sanguinoserous output.   : Tyson in place with clear urine  Skin: no erythema or rashes, no cyanosis  Extremities: no edema, no cyanosis  Neuro: A&Ox3, no focal deficits, sensation intact    Assessment and Plan  This is a 59 y.o. year old male s/p redo ileocecetomy, incisional hernia repair with retrorectus biosynthetic mesh placement and debridement of abdominal wall measuring 23x9 cm on 2/26    - Multimodal pain control  - Encourage IS use, OOB to chair and ambulation  - IVFs with LR at 125  - Diet: CLD  - Follow up AM labs  - Abx coverage with Ancef  - Possible tyson removal later this morning  - Dispo pending pain control and tolerance of diet    Jian Pulido DO  PGY-1, General Surgery Resident  02/27/24 6:17 AM  [phone]

## 2024-02-27 NOTE — PROGRESS NOTES
Patient alert and oriented X4.   O2 94% on 2L with NC. Encouraged IS and deep breathing.  Pain managed with scheduled Tylenol, Robaxin and PRN oxycodone.  Ambulated in the hallway with a front wheels walker. Tolerated well.  Austin in place, draining Lilly color urine. Austin care given.   IV fluid infusing. IV antibiotic given per schedule. Fall precautions in place.

## 2024-02-27 NOTE — OP NOTE
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    PROCEDURE:  Redo redo ileocecectomy.    A 22-modifier billed due to extensive adhesions requiring additional time and effort and tedious dissection as well as high risk of complications given redo redo operation as well as additional coordination of care with Dr. Samano of General Surgery.    ESTIMATED BLOOD LOSS:  100 mL.    SPECIMEN:  Redo ileocecectomy.    INDICATIONS:  The patient is a 59-year-old male.  He was referred to me by GI.  He was undergoing routine surveillance colonoscopy.  He was noted to have dysplastic tissue at his ileocolic anastomosis.  Of note, he has had Crohn's for over 30 years and had 3 previous abdominal surgeries.  He does not recall the exact details, but they [place] in another city and he did have at least temporary ileostomy at one point.  On exam, he had an extensive midline incision as well as a Kocher incision and an old ileostomy incision.  He was noted to have Prydeinig-cheese defect hernias as well on his imaging.  I involved Dr. Pelletier of General Surgery for involvement for the hernia repair as well.  I discussed the plan for attempted laparoscopic, but likely open laparotomy with redo ileocolic resection with plans for anastomosis.  Discussed the risks of the procedure including, but not limited to the risks of bleeding,  infection, anastomotic leak, hernia, need for additional operations, damage to internal structures including, but not limited to bowel, bladder, ureter, neurovascular structures.  He understood the potential need for an open operation, potential need for temporary or permanent ileostomy.  He understood this is a higher risk procedure due to the redo redo nature of it as well as the nature of Crohn disease itself and he agreed to proceed.    PROCEDURE DETAILS:  The patient was brought to the operating theater, placed supine on the operating table.  General endotracheal intubation was performed by Anesthesiology.  The patient was placed in supine position.  His arms padded and tucked.  Austin catheter was placed to clear urine.  His abdomen was prepped and draped in usual sterile fashion using chlorhexidine prep solution.  Time-out was performed, confirming the patient's identity as well as the operative site.  Antibiotics were confirmed to be perfusing.  All safety points were followed.  SCDs were on and functioning.  Lovenox was confirmed and given.    I began by making an incision in the left upper quadrant midclavicular line at Daniels point.  A 0 degree 5 mm laparoscope was used to enter the abdominal cavity under direct Optiview fashion.  The abdomen was entered without complication and insufflated to 15 mmHg.  Upon entering the abdomen, we noted a fair bit of midline adhesions and we placed a second 5 mm port in the left lateral abdomen and started to take down adhesions using the LigaSure device.  The lower abdomen was not too bad, but by the time we got to the top of the superior aspect where he had had multiple previous surgeries including cholecystectomy and redo ileocecectomy, the bowel was completely plastered to the abdominal wall.  I was a bit concerned taking this down.  I also noted several interloop adhesions.  At this point, we decided to convert to an open laparotomy incision.  Open laparotomy

## 2024-02-27 NOTE — PROGRESS NOTES
Department of Surgery:  Post-op Note    CC: high grade dysplasia of ileocolic anastomosis     Procedure(s) Performed: redo ileocecetomy, incisional hernia repair with retrorectus biosynthetic mesh placement and debridement of abdominal wall measuring 23x9 cm.     Subjective:   Pain is controlled, denies nausea or vomiting. Austin in place    Objective:  Anesthesia type: General      I/O    Intra op    Post op     Fluids  5000 mL mL      mL 0 mL     Urine  155 mL 120 mL     Exam:  Vitals:    02/26/24 1734 02/26/24 1957 02/26/24 2039 02/26/24 2230   BP: 121/81 108/69  114/75   Pulse: 92 87  92   Resp: 14 16 19 19   Temp: 97.9 °F (36.6 °C) 98 °F (36.7 °C)  97.9 °F (36.6 °C)   TempSrc: Oral Oral  Axillary   SpO2: 96% 94%  92%   Weight:       Height:           General appearance: alert, no acute distress, grooming appropriate  Chest/Lungs: No adventitious breath sounds. No increased work of breathing.   Cardiovascular: RRR  Abdomen: soft, appropriately tender, non-distended. Prevena with good seal. CAROL drain w/ SS output.   : Austin in place with clear urine  Skin: no erythema or rashes, no cyanosis  Extremities: no edema, no cyanosis  Neuro: A&Ox3, no focal deficits, sensation intact    Assessment and Plan  This is a 59 y.o. year old male s/p redo ileocecetomy, incisional hernia repair with retrorectus biosynthetic mesh placement and debridement of abdominal wall measuring 23x9 cm. POD #0    Pain management: MMPC  Cardiovascular: continue to monitor vitals as scheduled  Respiratory: extubated, encourage hourly incentive spirometry and deep breathing  FEN:  Fluids: , Diet: CLD  : Continue to monitor fluid output  Wound: local care, Prevena  Ambulation: OOB to chair, encourage ambulation  Prophylaxis: epi Arciniega DO  PGY1, General Surgery  02/27/24   12:27 AM   [facility]avia  [phone]

## 2024-02-27 NOTE — PLAN OF CARE
Problem: Discharge Planning  Goal: Discharge to home or other facility with appropriate resources  Outcome: Progressing  Flowsheets (Taken 2/26/2024 4717)  Discharge to home or other facility with appropriate resources:   Identify barriers to discharge with patient and caregiver   Arrange for needed discharge resources and transportation as appropriate     Problem: Pain  Goal: Verbalizes/displays adequate comfort level or baseline comfort level  Outcome: Progressing     Problem: Safety - Adult  Goal: Free from fall injury  Outcome: Progressing     Problem: ABCDS Injury Assessment  Goal: Absence of physical injury  Outcome: Progressing

## 2024-02-27 NOTE — CARE COORDINATION
Case Management Assessment  Initial Evaluation    Date/Time of Evaluation: 2/27/2024 12:16 PM  Assessment Completed by: Estela Talbot RN    If patient is discharged prior to next notation, then this note serves as note for discharge by case management.    Patient Name: Richard Lyman                   YOB: 1965  Diagnosis: Ileocolic anastomotic leak [K91.89]  Crohn's disease with complication, unspecified gastrointestinal tract location (HCC) [K50.919]  Hydronephrosis with renal and ureteral calculus obstruction [N13.2]  Incarcerated incisional hernia [K43.0]  Crohn's disease of colon with complication (HCC) [K50.119]                   Date / Time: 2/26/2024  8:53 AM    Patient Admission Status: Inpatient   Readmission Risk (Low < 19, Mod (19-27), High > 27): Readmission Risk Score: 6.9    Current PCP: Karine Cortes, DO  PCP verified by CM? Yes    Chart Reviewed: Yes      History Provided by: Patient  Patient Orientation: Alert and Oriented    Patient Cognition: Alert    Hospitalization in the last 30 days (Readmission):  No    If yes, Readmission Assessment in CM Navigator will be completed.    Advance Directives:      Code Status: Full Code   Patient's Primary Decision Maker is: Legal Next of Kin      Discharge Planning:    Patient lives with: Spouse/Significant Other Type of Home: House  Primary Care Giver: Self  Patient Support Systems include: Spouse/Significant Other   Current Financial resources: None  Current community resources: None  Current services prior to admission: None            Current DME:              Type of Home Care services:  OT, PT, Nursing Services    ADLS  Prior functional level: Independent in ADLs/IADLs  Current functional level: Assistance with the following:, Housework, Cooking, Shopping, Mobility    PT AM-PAC:   /24  OT AM-PAC:   /24    Family can provide assistance at DC: Yes  Would you like Case Management to discuss the discharge plan with any other family  with the discharge plan. Freedom of choice list with basic dialogue that supports the patient's individualized plan of care/goals and shares the quality data associated with the providers was provided to: Patient   Patient Representative Name:       The Patient and/or Patient Representative Agree with the Discharge Plan? Yes    Estela Talbot RN  Case Management Department  Ph: [phone] Fax: [phone]

## 2024-02-27 NOTE — PROGRESS NOTES
Department of Bariatric Surgery  Daily Progress Note    CC: ileocolic anastamotic leak, incisional hernia     Subjective:   No acute events overnight. Afebrile and hemodynamically stable. Pain fairly well controled. Denies nausea or vomiting. No gas or bowel movement. Ambulated once.     Objective:  Output by Drain (mL) 02/25/24 0701 - 02/25/24 1900 02/25/24 1901 - 02/26/24 0700 02/26/24 0701 - 02/26/24 1900 02/26/24 1901 - 02/27/24 0639   Closed/Suction Drain Left Abdomen;LLQ Bulb   140 220      Exam:  Vitals:    02/26/24 2230 02/27/24 0123 02/27/24 0153 02/27/24 0432   BP: 114/75   118/82   Pulse: 92   88   Resp: 19 19 19 19   Temp: 97.9 °F (36.6 °C)   98.1 °F (36.7 °C)   TempSrc: Axillary   Oral   SpO2: 92%   94%   Weight:       Height:         General appearance: alert, no acute distress  Chest/Lungs: No adventitious breath sounds. No increased work of breathing.   Cardiovascular: RR  Abdomen: soft, appropriately tender, non-distended. Prevena with good seal. CAROL drain w/ sanguinoserous output.   : Austin in place with kaylin urine  Skin: no erythema or rashes, no cyanosis  Extremities: no edema, no cyanosis  Neuro: A&Ox3, no focal deficits, sensation intact    Assessment and Plan  This is a 59 y.o. year old male s/p redo ileocecetomy, incisional hernia repair with retrorectus biosynthetic mesh placement and debridement of abdominal wall measuring 23x9 cm 2/26/2024 1 Day Post-Op      - Multimodal pain control  - Encourage IS use, OOB to chair and ambulation  - IVFs with LR at 125, titrate to urine output and PO intake  - Diet: CLD  - Monitoring CAROL output-- decreasing overtime with increasing serous character. Hgb stable this AM.  - ABx coverage with Ancef  - Austin removal at the discretion of the primary team  - Dispo pending pain control and tolerance of diet    Donovan Cordon DO  PGY1, General Surgery  02/27/24  6:37 AM  [facility]  Pager: [phone]       Addendum:  Agree with assessment and plan with changes

## 2024-02-27 NOTE — PROGRESS NOTES
Pt ambulated in halls and up to the chair. Pt pain controlled with prn meds. No c/o nausea. Tolerating clears. Voiding. Pt denies passing gas. VSS.     Electronically signed by Sundar De La Cruz RN on 2/27/2024 at 4:52 PM

## 2024-02-28 LAB
ALBUMIN SERPL-MCNC: 3 G/DL (ref 3.4–5)
ANION GAP SERPL CALCULATED.3IONS-SCNC: 7 MMOL/L (ref 3–16)
BASOPHILS # BLD: 0 K/UL (ref 0–0.2)
BASOPHILS NFR BLD: 0.3 %
BUN SERPL-MCNC: 10 MG/DL (ref 7–20)
CALCIUM SERPL-MCNC: 7.9 MG/DL (ref 8.3–10.6)
CHLORIDE SERPL-SCNC: 100 MMOL/L (ref 99–110)
CO2 SERPL-SCNC: 26 MMOL/L (ref 21–32)
CREAT SERPL-MCNC: 1 MG/DL (ref 0.9–1.3)
DEPRECATED RDW RBC AUTO: 14 % (ref 12.4–15.4)
EOSINOPHIL # BLD: 0 K/UL (ref 0–0.6)
EOSINOPHIL NFR BLD: 0.3 %
GFR SERPLBLD CREATININE-BSD FMLA CKD-EPI: >60 ML/MIN/{1.73_M2}
GLUCOSE SERPL-MCNC: 103 MG/DL (ref 70–99)
HCT VFR BLD AUTO: 33.9 % (ref 40.5–52.5)
HGB BLD-MCNC: 11.8 G/DL (ref 13.5–17.5)
LYMPHOCYTES # BLD: 1.6 K/UL (ref 1–5.1)
LYMPHOCYTES NFR BLD: 14.7 %
MAGNESIUM SERPL-MCNC: 1.9 MG/DL (ref 1.8–2.4)
MCH RBC QN AUTO: 32.9 PG (ref 26–34)
MCHC RBC AUTO-ENTMCNC: 34.8 G/DL (ref 31–36)
MCV RBC AUTO: 94.7 FL (ref 80–100)
MONOCYTES # BLD: 1.1 K/UL (ref 0–1.3)
MONOCYTES NFR BLD: 10.5 %
NEUTROPHILS # BLD: 7.9 K/UL (ref 1.7–7.7)
NEUTROPHILS NFR BLD: 74.2 %
PHOSPHATE SERPL-MCNC: 1.9 MG/DL (ref 2.5–4.9)
PLATELET # BLD AUTO: 202 K/UL (ref 135–450)
PMV BLD AUTO: 8.4 FL (ref 5–10.5)
POTASSIUM SERPL-SCNC: 4.1 MMOL/L (ref 3.5–5.1)
RBC # BLD AUTO: 3.58 M/UL (ref 4.2–5.9)
SODIUM SERPL-SCNC: 133 MMOL/L (ref 136–145)
WBC # BLD AUTO: 10.7 K/UL (ref 4–11)

## 2024-02-28 PROCEDURE — 83735 ASSAY OF MAGNESIUM: CPT

## 2024-02-28 PROCEDURE — 97116 GAIT TRAINING THERAPY: CPT

## 2024-02-28 PROCEDURE — 2580000003 HC RX 258

## 2024-02-28 PROCEDURE — 80069 RENAL FUNCTION PANEL: CPT

## 2024-02-28 PROCEDURE — 1200000000 HC SEMI PRIVATE

## 2024-02-28 PROCEDURE — 97530 THERAPEUTIC ACTIVITIES: CPT

## 2024-02-28 PROCEDURE — 2580000003 HC RX 258: Performed by: NURSE PRACTITIONER

## 2024-02-28 PROCEDURE — 99024 POSTOP FOLLOW-UP VISIT: CPT | Performed by: SURGERY

## 2024-02-28 PROCEDURE — 6370000000 HC RX 637 (ALT 250 FOR IP)

## 2024-02-28 PROCEDURE — 85025 COMPLETE CBC W/AUTO DIFF WBC: CPT

## 2024-02-28 PROCEDURE — 36415 COLL VENOUS BLD VENIPUNCTURE: CPT

## 2024-02-28 PROCEDURE — 2500000003 HC RX 250 WO HCPCS: Performed by: NURSE PRACTITIONER

## 2024-02-28 PROCEDURE — 97166 OT EVAL MOD COMPLEX 45 MIN: CPT

## 2024-02-28 PROCEDURE — 6370000000 HC RX 637 (ALT 250 FOR IP): Performed by: SURGERY

## 2024-02-28 PROCEDURE — 6360000002 HC RX W HCPCS: Performed by: SURGERY

## 2024-02-28 PROCEDURE — 97162 PT EVAL MOD COMPLEX 30 MIN: CPT

## 2024-02-28 PROCEDURE — 97535 SELF CARE MNGMENT TRAINING: CPT

## 2024-02-28 PROCEDURE — 2580000003 HC RX 258: Performed by: SURGERY

## 2024-02-28 RX ORDER — MECOBALAMIN 5000 MCG
10 TABLET,DISINTEGRATING ORAL NIGHTLY
Status: DISCONTINUED | OUTPATIENT
Start: 2024-02-28 | End: 2024-03-01 | Stop reason: HOSPADM

## 2024-02-28 RX ADMIN — OXYCODONE 5 MG: 5 TABLET ORAL at 00:31

## 2024-02-28 RX ADMIN — ENOXAPARIN SODIUM 40 MG: 100 INJECTION SUBCUTANEOUS at 08:58

## 2024-02-28 RX ADMIN — SODIUM CHLORIDE, PRESERVATIVE FREE 10 ML: 5 INJECTION INTRAVENOUS at 21:24

## 2024-02-28 RX ADMIN — SODIUM CHLORIDE, PRESERVATIVE FREE 10 ML: 5 INJECTION INTRAVENOUS at 08:59

## 2024-02-28 RX ADMIN — Medication 10 MG: at 01:25

## 2024-02-28 RX ADMIN — ACETAMINOPHEN 1000 MG: 500 TABLET ORAL at 17:01

## 2024-02-28 RX ADMIN — ACETAMINOPHEN 1000 MG: 500 TABLET ORAL at 01:24

## 2024-02-28 RX ADMIN — PAROXETINE HYDROCHLORIDE 10 MG: 10 TABLET, FILM COATED ORAL at 21:24

## 2024-02-28 RX ADMIN — OXYCODONE 5 MG: 5 TABLET ORAL at 11:37

## 2024-02-28 RX ADMIN — METHOCARBAMOL 1500 MG: 750 TABLET ORAL at 08:58

## 2024-02-28 RX ADMIN — SODIUM CHLORIDE, POTASSIUM CHLORIDE, SODIUM LACTATE AND CALCIUM CHLORIDE: 600; 310; 30; 20 INJECTION, SOLUTION INTRAVENOUS at 00:39

## 2024-02-28 RX ADMIN — SODIUM CHLORIDE, POTASSIUM CHLORIDE, SODIUM LACTATE AND CALCIUM CHLORIDE: 600; 310; 30; 20 INJECTION, SOLUTION INTRAVENOUS at 09:20

## 2024-02-28 RX ADMIN — METHOCARBAMOL 1500 MG: 750 TABLET ORAL at 11:36

## 2024-02-28 RX ADMIN — OXYCODONE 5 MG: 5 TABLET ORAL at 20:42

## 2024-02-28 RX ADMIN — ACETAMINOPHEN 1000 MG: 500 TABLET ORAL at 06:28

## 2024-02-28 RX ADMIN — METHOCARBAMOL 1500 MG: 750 TABLET ORAL at 20:42

## 2024-02-28 RX ADMIN — Medication 10 MG: at 20:42

## 2024-02-28 RX ADMIN — SODIUM PHOSPHATE, MONOBASIC, MONOHYDRATE AND SODIUM PHOSPHATE, DIBASIC, ANHYDROUS 20 MMOL: 142; 276 INJECTION, SOLUTION INTRAVENOUS at 11:43

## 2024-02-28 RX ADMIN — METHOCARBAMOL 1500 MG: 750 TABLET ORAL at 17:01

## 2024-02-28 RX ADMIN — PANTOPRAZOLE SODIUM 40 MG: 40 TABLET, DELAYED RELEASE ORAL at 08:59

## 2024-02-28 RX ADMIN — ACETAMINOPHEN 1000 MG: 500 TABLET ORAL at 11:37

## 2024-02-28 ASSESSMENT — PAIN SCALES - GENERAL
PAINLEVEL_OUTOF10: 0
PAINLEVEL_OUTOF10: 3
PAINLEVEL_OUTOF10: 5
PAINLEVEL_OUTOF10: 5
PAINLEVEL_OUTOF10: 4
PAINLEVEL_OUTOF10: 4
PAINLEVEL_OUTOF10: 3
PAINLEVEL_OUTOF10: 3

## 2024-02-28 ASSESSMENT — PAIN - FUNCTIONAL ASSESSMENT
PAIN_FUNCTIONAL_ASSESSMENT: ACTIVITIES ARE NOT PREVENTED

## 2024-02-28 ASSESSMENT — PAIN DESCRIPTION - ORIENTATION
ORIENTATION: MID

## 2024-02-28 ASSESSMENT — PAIN DESCRIPTION - FREQUENCY
FREQUENCY: CONTINUOUS
FREQUENCY: CONTINUOUS

## 2024-02-28 ASSESSMENT — PAIN DESCRIPTION - DESCRIPTORS
DESCRIPTORS: DISCOMFORT
DESCRIPTORS: SORE
DESCRIPTORS: ACHING
DESCRIPTORS: ACHING
DESCRIPTORS: ACHING;DULL;SORE
DESCRIPTORS: ACHING;DULL;SORE

## 2024-02-28 ASSESSMENT — PAIN DESCRIPTION - LOCATION
LOCATION: ABDOMEN

## 2024-02-28 ASSESSMENT — PAIN DESCRIPTION - PAIN TYPE
TYPE: SURGICAL PAIN
TYPE: SURGICAL PAIN

## 2024-02-28 ASSESSMENT — PAIN DESCRIPTION - ONSET
ONSET: ON-GOING
ONSET: ON-GOING

## 2024-02-28 NOTE — PROGRESS NOTES
Occupational Therapy  Facility/Department: [address]  Occupational Therapy Initial Assessment/Treatment    Name: Richard Lyman  : 1965  MRN: [id number]  Date of Service: 2024    Discharge Recommendations:  Continue to assess pending progress, 24 hour supervision or assist, Patient would benefit from continued therapy after discharge, IP Rehab  OT Equipment Recommendations  Equipment Needed: Yes  Mobility Devices: ADL Assistive Devices;Walker  Walker: Rolling  ADL Assistive Devices: Toileting - 3-in-1 Commode  Other: cont to assess pending progress       Patient Diagnosis(es): Diagnoses of Ileocolic anastomotic leak, Crohn's disease with complication, unspecified gastrointestinal tract location (HCC), Hydronephrosis with renal and ureteral calculus obstruction, and Incarcerated incisional hernia were pertinent to this visit.  Past Medical History:  has a past medical history of Acute Crohn's disease (HCC), Anemia, and History of kidney stones.  Past Surgical History:  has a past surgical history that includes Bowel resection; Cholecystectomy; Lithotripsy; hemicolectomy (N/A, 2024); hemicolectomy (N/A, 2024); and Umbilical hernia repair (N/A, 2024).    Treatment Diagnosis: impaired ADLs and functional mobility/transfers      Assessment   Performance deficits / Impairments: Decreased functional mobility ;Decreased endurance;Decreased ADL status;Decreased balance  Assessment: Pt is a 60 y/o M who presents s/p INCISIONAL HERNA REPAIR WITH RETRORECTUS BIOSYNTHETIC MESH PLACEMENT AND DEBRIDEMET OF ABDOMINAL WALL MEASURING 23x9 CENTIMETERS. Pt is from home w/ family and reports being independent w/ all ADLs, IADLs, transfers, and functional mobility pta. Currently, pt demo ADL performance and functional mobility below his reported baseline. Pt requires total A for LE dressing, Min A for transfers, Min A for functional mobility w/ RW, and Max A for supine to sit. Pt hopeful to

## 2024-02-28 NOTE — PROGRESS NOTES
Physical Therapy  Facility/Department: [address]  Physical Therapy Initial Assessment    Name: Richard Lyman  : 1965  MRN: [id number]  Date of Service: 2024    Discharge Recommendations:  IP Rehab, 24 hour supervision or assist   PT Equipment Recommendations  Equipment Needed:  (RW if home)      Patient Diagnosis(es): Diagnoses of Ileocolic anastomotic leak, Crohn's disease with complication, unspecified gastrointestinal tract location (HCC), Hydronephrosis with renal and ureteral calculus obstruction, and Incarcerated incisional hernia were pertinent to this visit.  Past Medical History:  has a past medical history of Acute Crohn's disease (HCC), Anemia, and History of kidney stones.  Past Surgical History:  has a past surgical history that includes Bowel resection; Cholecystectomy; Lithotripsy; hemicolectomy (N/A, 2024); hemicolectomy (N/A, 2024); and Umbilical hernia repair (N/A, 2024).    Assessment   Body Structures, Functions, Activity Limitations Requiring Skilled Therapeutic Intervention: Decreased functional mobility ;Decreased strength;Decreased safe awareness  Assessment: 58 yo admitted 24 for INCISIONAL HERNA REPAIR WITH RETRORECTUS BIOSYNTHETIC MESH PLACEMENT AND DEBRIDEMET OF ABDOMINAL WALL MEASURING 23x9 CENTIMETERS. Pt demo mobility below his reported baseline of independent at home without AD and still working full time. Pt required max assist for bed mobility, min assist for transfers, CGA for gait with RW this session. Pt hopeful to return home at discharge but pt/wife verbalized concerns over pt going home too soon. Anticipate pt will progress well as pain is controlled. If home, recommend 24 hour capable assist, home PT and use of RW. Pt may benefit from ARU if progress is slowed by pain control.  Treatment Diagnosis: mobility impairment due to hernia repair  Decision Making: Medium Complexity  Requires PT Follow-Up: Yes  Activity  Tolerance  Activity Tolerance: Patient tolerated evaluation without incident;Patient tolerated treatment well;Patient limited by fatigue;Patient limited by pain     Plan   Physical Therapy Plan  General Plan:  (2-5)  Current Treatment Recommendations: Functional mobility training, Transfer training, Endurance training, Gait training, Strengthening  Safety Devices  Type of Devices: Left in chair, Nurse notified, Chair alarm in place, Call light within reach     Restrictions  Position Activity Restriction  Other position/activity restrictions: up with assist     Subjective   Pain: rates incision site pain 5/10, RN aware  General  Chart Reviewed: Yes  Additional Pertinent Hx: 60 yo admitted 2/26/24 for INCISIONAL HERNA REPAIR WITH RETRORECTUS BIOSYNTHETIC MESH PLACEMENT AND DEBRIDEMET OF ABDOMINAL WALL MEASURING 23x9 CENTIMETERS. Pmhx: Crohn's disease with complication, High grade dysplasia in the area of ileocolonic anastomosis.  Diagnosis: INCISIONAL HERNA REPAIR WITH RETRORECTUS BIOSYNTHETIC MESH PLACEMENT AND DEBRIDEMET OF ABDOMINAL WALL MEASURING 23x9 CENTIMETERS         Social/Functional History  Social/Functional History  Lives With: Spouse (26 yo son living at home)  Type of Home: House  Home Layout: 1/2 bath on main level, Two level, Bed/Bath upstairs  Home Access:  (threshold through front)  Bathroom Shower/Tub: Walk-in shower  Bathroom Toilet: Standard  Home Equipment:  (no DME)  Has the patient had two or more falls in the past year or any fall with injury in the past year?: No  ADL Assistance: Independent  Ambulation Assistance: Independent  Transfer Assistance: Independent  Active : Yes  Occupation: Full time employment  Type of Occupation: teacher-tech director    Vision/Hearing  Vision  Vision: Impaired  Vision Exceptions: Wears glasses at all times  Hearing  Hearing: Within functional limits      Cognition   Orientation  Overall Orientation Status: Within Functional Limits  Cognition  Overall

## 2024-02-28 NOTE — CARE COORDINATION
Patient is from home with spouse and plans on returning to home at d/c. Referral sent to [place] with [facility] for home care.     Therapy evals today to determine potential DME and home care needs.     Need wound vac paperwork to submit for approval with [facility] for home vac.     Electronically signed by Estela Talbot RN on 2/28/2024 at 11:35 AM  [phone]

## 2024-02-28 NOTE — PROGRESS NOTES
02/28/24 1127   Encounter Summary   Encounter Overview/Reason  Initial Encounter   Service Provided For: Family;Patient not available   Support System Spouse   Last Encounter  02/28/24  (CV-MSR)   Begin Time 1125   End Time  1126   Total Time Calculated 1 min   Plan and Referrals   Plan/Referrals Continue to visit, (comment)      attempted visits with pt, to offer emotional and spiritual support. Pt not available at time of visit.  spoke with pts wife,  will follow up.  Rev. Mavis Holbrook Mdiv,before BCC

## 2024-02-28 NOTE — DISCHARGE INSTRUCTIONS
Discharge Instructions:    Diet:   You may resume a regular diet.     Medications:  Please hold off on receiving your next Remicade dose on March 15, 2024. You may discuss resumption at your post-operative check-up with Dr. Weiner.    Wound Care:   Skin glue was used to cover your incision(s). Please note that this glue is tinted purple; therefore, a slight purple hue around your incisions is normal. The skin glue will fall off on its own in about 10 days. You may shower, but do not scrub the incision sites directly or soak (tub, pool, etc.).    Prevena to remain on and attached to suction until follow up.    CAROL to be emptied daily and if full. Please record the output and bring this with your to your follow up appointment.     Activity:   No heavy lifting greater than a milk jug (~8lbs) for 4-6 weeks. This recommendation is made to reduce your risk of developing an incisional hernia. You will receive further instruction at your follow-up appointment.    Pain management:   Unless informed of any restrictions by your primary care physician, please use your preferred over-the-counter pain reliever as your primary pain medication. If you have pain that persists despite over-the-counter pain medications, you have been provided with a prescription for an opioid/narcotic pain reliever (oxycodone).     No driving or operating machinery while taking opioid/narcotic medications.     If you are not taking the opioid pain medication, then you can drive when you feel as though you can sit comfortably behind the steering wheel and can slam on the brake or turn the wheel sharply without it hurting. We recommend that you practice this while sitting the car with it parked in your driveway before trying to drive on the road.    Bowel Regimen:   Opioid/Narcotic pain relievers have a common side effect of constipation; therefore, you have been provided with a prescription for a stool softener, Docusate (Colace). Please note that this

## 2024-02-28 NOTE — PROGRESS NOTES
Department of Surgery  Daily Progress Note    CC: high grade dysplasia of ileocolic anastomosis    Procedure(s) Performed: redo ileocecetomy, incisional hernia repair with retrorectus biosynthetic mesh placement and debridement of abdominal wall measuring 23x9 cm.     Subjective:   Afebrile, hemodynamically stable. No acute events overnight. Pain is stable. No flatus or bowel movement. Ambulating.     Objective:  Exam:  Vitals:    02/27/24 2150 02/28/24 0015 02/28/24 0101 02/28/24 0511   BP:  (!) 145/96  (!) 142/78   Pulse:  95  76   Resp: 16 16 16 16   Temp:  98.9 °F (37.2 °C)  98.7 °F (37.1 °C)   TempSrc:  Oral  Oral   SpO2:  95%  95%   Weight:       Height:           General appearance: alert, no acute distress  Chest/Lungs: No adventitious breath sounds. No increased work of breathing on 3 L via NC  Cardiovascular: RRR  Abdomen: soft, appropriately tender, non-distended. Prevena with good seal. CAROL drain w/ sanguinoserous output.   Skin: no erythema or rashes, no cyanosis  Extremities: no edema, no cyanosis  Neuro: A&Ox3, no focal deficits, sensation intact    Assessment and Plan  This is a 59 y.o. year old male with high grade dysplasia of ileocolic anastomosis s/p redo ileocecetomy, incisional hernia repair with retrorectus biosynthetic mesh placement and debridement of abdominal wall measuring 23x9 cm on 2/26    - Multimodal pain control  - Encourage IS use, OOB to chair and ambulation  - IVFs with LR at 50  - Diet: CLD, awaiting return of bowel function  - Abx coverage with Ancef  - Dispo pending pain control and tolerance of diet    Jian Pulido DO  PGY-1, General Surgery Resident  02/28/24 6:22 AM  [phone]    Attending Supervising Physician's Attestation Statement  I performed a history and physical examination on the patient and discussed the management with the resident physician. I reviewed and agree with the findings and plan as documented in his note .    Electronically signed by Mathieu Weiner MD  on 2/28/24 at 8:23 AM EST

## 2024-02-28 NOTE — PROGRESS NOTES
Department of Bariatric Surgery  Daily Progress Note    CC: ileocolic anastamotic leak, incisional hernia     Subjective:   No acute events overnight.  Pain well-controlled at this time. Voiding without issue after tyson removal. Tolerating clear liquid diet without nausea/vomiting. No bowel function yet.  No further complaints this morning.      Objective:  Output by Drain (mL) 02/26/24 0701 - 02/26/24 1900 02/26/24 1901 - 02/27/24 0700 02/27/24 0701 - 02/27/24 1900 02/27/24 1901 - 02/28/24 0610   Closed/Suction Drain Left Abdomen;LLQ Bulb 140 220 95 80      Exam:  Vitals:    02/27/24 2150 02/28/24 0015 02/28/24 0101 02/28/24 0511   BP:  (!) 145/96  (!) 142/78   Pulse:  95  76   Resp: 16 16 16 16   Temp:  98.9 °F (37.2 °C)  98.7 °F (37.1 °C)   TempSrc:  Oral  Oral   SpO2:  95%  95%   Weight:       Height:         General appearance: alert, no acute distress  Chest/Lungs: No adventitious breath sounds. No increased work of breathing.   Cardiovascular: RR  Abdomen: soft, appropriately tender, non-distended. Prevena with good seal. CAROL drain w/ SS output.   Skin: no erythema or rashes, no cyanosis  Extremities: no edema, no cyanosis  Neuro: A&Ox3, no focal deficits, sensation intact    Assessment and Plan  This is a 59 y.o. year old male s/p redo ileocecetomy, incisional hernia repair with retrorectus biosynthetic mesh placement and debridement of abdominal wall measuring 23x9 cm 2/26/2024 2 Days Post-Op      - Multimodal pain control  - Encourage IS use, OOB to chair and ambulation  - IVFs with LR at 75, titrate to urine output and PO intake  - Diet: CLD  - Monitoring CAROL output-- decreasing overtime with increasing serous character.  - ABx coverage with Ancef  - Dispo pending pain control and tolerance of diet    Donovan Cordon DO  PGY1, General Surgery  02/28/24  6:10 AM  [employer]  Pager: [phone]

## 2024-02-28 NOTE — PROGRESS NOTES
Patient A&Ox4. VSS on  3 L NC . Patient reports pain of 5-6/10 during the shift and was given scheduled Robaxin and Tylenol and prn oxycodone and Dilaudid with benefit. Patient has been voiding freely into urinal. 2 lap sites and wound vac drsg cdi. Patient was having difficulty getting up and walking due to pain, but was able to get up once after pain medication was administered. Patient tolerated CLD. All care needs addressed with no further needs at this time. Bed is locked and in lowest position with alarm on. Call light and bedside table within reach.     Electronically signed by Cooper Thomas RN on 2/28/2024 at 5:57 AM

## 2024-02-29 LAB
ALBUMIN SERPL-MCNC: 2.6 G/DL (ref 3.4–5)
ANION GAP SERPL CALCULATED.3IONS-SCNC: 9 MMOL/L (ref 3–16)
BASOPHILS # BLD: 0.1 K/UL (ref 0–0.2)
BASOPHILS NFR BLD: 0.5 %
BUN SERPL-MCNC: 10 MG/DL (ref 7–20)
CALCIUM SERPL-MCNC: 7.7 MG/DL (ref 8.3–10.6)
CHLORIDE SERPL-SCNC: 103 MMOL/L (ref 99–110)
CO2 SERPL-SCNC: 23 MMOL/L (ref 21–32)
CREAT SERPL-MCNC: 0.9 MG/DL (ref 0.9–1.3)
DEPRECATED RDW RBC AUTO: 13.8 % (ref 12.4–15.4)
EOSINOPHIL # BLD: 0.2 K/UL (ref 0–0.6)
EOSINOPHIL NFR BLD: 1.6 %
GFR SERPLBLD CREATININE-BSD FMLA CKD-EPI: >60 ML/MIN/{1.73_M2}
GLUCOSE SERPL-MCNC: 104 MG/DL (ref 70–99)
HCT VFR BLD AUTO: 30.7 % (ref 40.5–52.5)
HGB BLD-MCNC: 10.9 G/DL (ref 13.5–17.5)
LYMPHOCYTES # BLD: 1.5 K/UL (ref 1–5.1)
LYMPHOCYTES NFR BLD: 15.9 %
MAGNESIUM SERPL-MCNC: 1.9 MG/DL (ref 1.8–2.4)
MCH RBC QN AUTO: 33.6 PG (ref 26–34)
MCHC RBC AUTO-ENTMCNC: 35.6 G/DL (ref 31–36)
MCV RBC AUTO: 94.4 FL (ref 80–100)
MONOCYTES # BLD: 0.9 K/UL (ref 0–1.3)
MONOCYTES NFR BLD: 8.9 %
NEUTROPHILS # BLD: 7.1 K/UL (ref 1.7–7.7)
NEUTROPHILS NFR BLD: 73.1 %
PHOSPHATE SERPL-MCNC: 1.7 MG/DL (ref 2.5–4.9)
PLATELET # BLD AUTO: 217 K/UL (ref 135–450)
PMV BLD AUTO: 8.1 FL (ref 5–10.5)
POTASSIUM SERPL-SCNC: 3.8 MMOL/L (ref 3.5–5.1)
RBC # BLD AUTO: 3.25 M/UL (ref 4.2–5.9)
SODIUM SERPL-SCNC: 135 MMOL/L (ref 136–145)
WBC # BLD AUTO: 9.7 K/UL (ref 4–11)

## 2024-02-29 PROCEDURE — 97116 GAIT TRAINING THERAPY: CPT

## 2024-02-29 PROCEDURE — 1200000000 HC SEMI PRIVATE

## 2024-02-29 PROCEDURE — 6370000000 HC RX 637 (ALT 250 FOR IP): Performed by: SURGERY

## 2024-02-29 PROCEDURE — 2580000003 HC RX 258: Performed by: SURGERY

## 2024-02-29 PROCEDURE — 99024 POSTOP FOLLOW-UP VISIT: CPT | Performed by: SURGERY

## 2024-02-29 PROCEDURE — 2500000003 HC RX 250 WO HCPCS

## 2024-02-29 PROCEDURE — 6360000002 HC RX W HCPCS: Performed by: SURGERY

## 2024-02-29 PROCEDURE — 97530 THERAPEUTIC ACTIVITIES: CPT

## 2024-02-29 PROCEDURE — 83735 ASSAY OF MAGNESIUM: CPT

## 2024-02-29 PROCEDURE — 2580000003 HC RX 258

## 2024-02-29 PROCEDURE — 6370000000 HC RX 637 (ALT 250 FOR IP): Performed by: STUDENT IN AN ORGANIZED HEALTH CARE EDUCATION/TRAINING PROGRAM

## 2024-02-29 PROCEDURE — 6370000000 HC RX 637 (ALT 250 FOR IP)

## 2024-02-29 PROCEDURE — 36415 COLL VENOUS BLD VENIPUNCTURE: CPT

## 2024-02-29 PROCEDURE — 85025 COMPLETE CBC W/AUTO DIFF WBC: CPT

## 2024-02-29 PROCEDURE — 97535 SELF CARE MNGMENT TRAINING: CPT

## 2024-02-29 PROCEDURE — 80069 RENAL FUNCTION PANEL: CPT

## 2024-02-29 RX ORDER — METHOCARBAMOL 500 MG/1
1000 TABLET, FILM COATED ORAL 3 TIMES DAILY
Status: DISCONTINUED | OUTPATIENT
Start: 2024-02-29 | End: 2024-03-01 | Stop reason: HOSPADM

## 2024-02-29 RX ORDER — DOCUSATE SODIUM 100 MG/1
100 CAPSULE, LIQUID FILLED ORAL 2 TIMES DAILY
Qty: 60 CAPSULE | Refills: 0 | Status: SHIPPED | OUTPATIENT
Start: 2024-02-29 | End: 2024-02-29

## 2024-02-29 RX ORDER — ENOXAPARIN SODIUM 100 MG/ML
40 INJECTION SUBCUTANEOUS DAILY
Qty: 11.2 ML | Refills: 0 | Status: SHIPPED | OUTPATIENT
Start: 2024-02-29 | End: 2024-03-28

## 2024-02-29 RX ORDER — METHOCARBAMOL 750 MG/1
750 TABLET, FILM COATED ORAL 3 TIMES DAILY
Qty: 15 TABLET | Refills: 0 | Status: SHIPPED | OUTPATIENT
Start: 2024-02-29 | End: 2024-03-05

## 2024-02-29 RX ORDER — OXYCODONE HYDROCHLORIDE 5 MG/1
5 TABLET ORAL EVERY 6 HOURS PRN
Qty: 28 TABLET | Refills: 0 | Status: SHIPPED | OUTPATIENT
Start: 2024-02-29 | End: 2024-03-07

## 2024-02-29 RX ORDER — OXYCODONE HYDROCHLORIDE 5 MG/1
5 TABLET ORAL EVERY 6 HOURS PRN
Qty: 28 TABLET | Refills: 0 | Status: SHIPPED | OUTPATIENT
Start: 2024-02-29 | End: 2024-02-29

## 2024-02-29 RX ORDER — DOCUSATE SODIUM 100 MG/1
100 CAPSULE, LIQUID FILLED ORAL 2 TIMES DAILY
Qty: 60 CAPSULE | Refills: 0 | Status: SHIPPED | OUTPATIENT
Start: 2024-02-29 | End: 2024-03-30

## 2024-02-29 RX ADMIN — PANTOPRAZOLE SODIUM 40 MG: 40 TABLET, DELAYED RELEASE ORAL at 09:14

## 2024-02-29 RX ADMIN — ACETAMINOPHEN 1000 MG: 500 TABLET ORAL at 23:25

## 2024-02-29 RX ADMIN — METHOCARBAMOL 1000 MG: 500 TABLET ORAL at 09:14

## 2024-02-29 RX ADMIN — Medication 10 MG: at 21:04

## 2024-02-29 RX ADMIN — METHOCARBAMOL 1000 MG: 500 TABLET ORAL at 12:50

## 2024-02-29 RX ADMIN — SODIUM CHLORIDE, PRESERVATIVE FREE 10 ML: 5 INJECTION INTRAVENOUS at 21:08

## 2024-02-29 RX ADMIN — ENOXAPARIN SODIUM 40 MG: 100 INJECTION SUBCUTANEOUS at 09:14

## 2024-02-29 RX ADMIN — METHOCARBAMOL 1000 MG: 500 TABLET ORAL at 21:06

## 2024-02-29 RX ADMIN — SODIUM CHLORIDE, PRESERVATIVE FREE 10 ML: 5 INJECTION INTRAVENOUS at 09:15

## 2024-02-29 RX ADMIN — ACETAMINOPHEN 1000 MG: 500 TABLET ORAL at 01:41

## 2024-02-29 RX ADMIN — PAROXETINE HYDROCHLORIDE 10 MG: 10 TABLET, FILM COATED ORAL at 21:07

## 2024-02-29 RX ADMIN — ACETAMINOPHEN 1000 MG: 500 TABLET ORAL at 12:49

## 2024-02-29 RX ADMIN — OXYCODONE 5 MG: 5 TABLET ORAL at 01:41

## 2024-02-29 RX ADMIN — SODIUM PHOSPHATE, MONOBASIC, MONOHYDRATE AND SODIUM PHOSPHATE, DIBASIC, ANHYDROUS 30 MMOL: 142; 276 INJECTION, SOLUTION INTRAVENOUS at 09:32

## 2024-02-29 RX ADMIN — OXYCODONE 5 MG: 5 TABLET ORAL at 18:08

## 2024-02-29 RX ADMIN — ACETAMINOPHEN 1000 MG: 500 TABLET ORAL at 18:08

## 2024-02-29 ASSESSMENT — PAIN DESCRIPTION - ONSET: ONSET: ON-GOING

## 2024-02-29 ASSESSMENT — PAIN DESCRIPTION - DESCRIPTORS: DESCRIPTORS: ACHING;DULL;SORE

## 2024-02-29 ASSESSMENT — PAIN DESCRIPTION - ORIENTATION: ORIENTATION: MID

## 2024-02-29 ASSESSMENT — PAIN - FUNCTIONAL ASSESSMENT: PAIN_FUNCTIONAL_ASSESSMENT: ACTIVITIES ARE NOT PREVENTED

## 2024-02-29 ASSESSMENT — PAIN SCALES - WONG BAKER: WONGBAKER_NUMERICALRESPONSE: 2

## 2024-02-29 ASSESSMENT — PAIN DESCRIPTION - PAIN TYPE: TYPE: SURGICAL PAIN

## 2024-02-29 ASSESSMENT — PAIN SCALES - GENERAL
PAINLEVEL_OUTOF10: 3
PAINLEVEL_OUTOF10: 3
PAINLEVEL_OUTOF10: 5

## 2024-02-29 ASSESSMENT — PAIN DESCRIPTION - LOCATION: LOCATION: ABDOMEN

## 2024-02-29 ASSESSMENT — PAIN DESCRIPTION - FREQUENCY: FREQUENCY: CONTINUOUS

## 2024-02-29 NOTE — PROGRESS NOTES
02/29/24 1349   Encounter Summary   Encounter Overview/Reason  Follow-up   Service Provided For: Patient and family together   Referral/Consult From: Rounding   Support System Spouse   Last Encounter  02/29/24  (MSR)   Begin Time 1320   End Time  1330   Total Time Calculated 10 min   Plan and Referrals   Plan/Referrals No future visits requested     Pt and family followed up with. Pt shared narrative of surgery and healing. The pt was supported with supportive listening, pt doesn't request anything to support him spiritually. Pt is hopeful to go home.  has no further visits planned.  Mavis Holbrook Mdiv., BCC

## 2024-02-29 NOTE — PROGRESS NOTES
Occupational Therapy  Facility/Department: [address]  Occupational Therapy Treatment  Name: Richard Lyman  : 1965  MRN: [id number]  Date of Service: 2024    Discharge Recommendations:  24 hour supervision or assist  OT Equipment Recommendations  Walker: Rolling  ADL Assistive Devices: Toileting - 3-in-1 Commode  Other: CM assisting pt get BSC, RW      Treatment Diagnosis: impaired ADLs and functional mobility/transfers    Assessment   Performance deficits / Impairments: Decreased functional mobility ;Decreased endurance;Decreased ADL status;Decreased balance  Assessment: Pt with decreased surgical pain this date, demo progressing tolerance for mobility and ADLs. Pt ambulated hallway and steps x4 with walker and cga. Pt donned underwear with CGA, anticipate assist needed for socks/shoes. Pt educated on modificaitons for ADLs to increase independence and for pain management. Anticipate pt will continue to progress well with increased activity. Anticipate pt safe to d/c home with initial 24hr assist from wife  Treatment Diagnosis: impaired ADLs and functional mobility/transfers  REQUIRES OT FOLLOW-UP: Yes  Activity Tolerance  Activity Tolerance: Patient limited by fatigue        Plan   Occupational Therapy Plan  Times Per Week: 2-5  Current Treatment Recommendations: Balance training, Functional mobility training, Safety education & training, Endurance training, Patient/Caregiver education & training, Self-Care / ADL, Equipment evaluation, education, & procurement     Restrictions  Position Activity Restriction  Other position/activity restrictions: up with assist    Subjective   General  Chart Reviewed: Yes  Patient assessed for rehabilitation services?: Yes  Additional Pertinent Hx: 59 y.o. year old male with high grade dysplasia of ileocolic anastomosis s/p redo ileocecetomy, incisional hernia repair with retrorectus biosynthetic mesh placement and debridement of abdominal wall measuring

## 2024-02-29 NOTE — PROGRESS NOTES
Department of Bariatric Surgery  Daily Progress Note    CC: ileocolic anastamotic leak, incisional hernia     Subjective:   No acute events overnight.  Pain well-controlled at this time. Tolerating diet without nausea/vomiting. Had BM overnight.  No further complaints this morning.      Objective:  Output by Drain (mL) 02/27/24 0701 - 02/27/24 1900 02/27/24 1901 - 02/28/24 0700 02/28/24 0701 - 02/28/24 1900 02/28/24 1901 - 02/29/24 0700 02/29/24 0701 - 02/29/24 0717   Closed/Suction Drain Left Abdomen;LLQ Bulb 95 130 140 60       Exam:  Vitals:    02/28/24 2041 02/28/24 2112 02/29/24 0130 02/29/24 0211   BP:   (!) 141/84    Pulse:   94    Resp:  18 20 18   Temp:   99.4 °F (37.4 °C)    TempSrc:   Oral    SpO2: 92%  93%    Weight:       Height:         General appearance: alert, no acute distress  Chest/Lungs: No adventitious breath sounds. No increased work of breathing.   Cardiovascular: RR  Abdomen: soft, appropriately tender, non-distended. Prevena with good seal. CAROL drain w/ SS output.   Skin: no erythema or rashes, no cyanosis  Extremities: no edema, no cyanosis  Neuro: A&Ox3, no focal deficits, sensation intact    Assessment and Plan  This is a 59 y.o. year old male s/p redo ileocecetomy, incisional hernia repair with retrorectus biosynthetic mesh placement and debridement of abdominal wall measuring 23x9 cm 2/26/2024 3 Days Post-Op      - Multimodal pain control  - Encourage IS use, OOB to chair and ambulation  - Monitoring CAROL output  - ABx coverage with Ancef  - Dispo pending pain control and tolerance of diet  PT recommending additional therapy prior to discharge  Likely d/c tomorrow

## 2024-02-29 NOTE — PROGRESS NOTES
Pt alert and oriented. VSS. Pt c/o of pain, Oxycodone given. Tolerating diet well. Wife at bedside. Wife and Pt educated on CAROL drain care. Call light within reach. Bed in lowest position. Call light within reach. Pt states no other needs at this time.   Electronically signed by Roxane John RN on 2/28/2024 at 8:06 PM

## 2024-02-29 NOTE — PROGRESS NOTES
Physical Therapy  Facility/Department: [address]  Physical Therapy treatment note    Name: Richard Lyman  : 1965  MRN: [id number]  Date of Service: 2024    Discharge Recommendations:  24 hour supervision or assist   PT Equipment Recommendations  Equipment Needed:  (RW for home)      Patient Diagnosis(es): The primary encounter diagnosis was Acute post-operative pain. Diagnoses of Ileocolic anastomotic leak, Crohn's disease with complication, unspecified gastrointestinal tract location (HCC), Hydronephrosis with renal and ureteral calculus obstruction, and Incarcerated incisional hernia were also pertinent to this visit.  Past Medical History:  has a past medical history of Acute Crohn's disease (HCC), Anemia, and History of kidney stones.  Past Surgical History:  has a past surgical history that includes Bowel resection; Cholecystectomy; Lithotripsy; hemicolectomy (N/A, 2024); hemicolectomy (N/A, 2024); and Umbilical hernia repair (N/A, 2024).    Assessment   Body Structures, Functions, Activity Limitations Requiring Skilled Therapeutic Intervention: Decreased functional mobility ;Decreased strength;Decreased safe awareness  Assessment: 58 yo admitted 24 for INCISIONAL HERNA REPAIR WITH RETRORECTUS BIOSYNTHETIC MESH PLACEMENT AND DEBRIDEMET OF ABDOMINAL WALL MEASURING 23x9 CENTIMETERS. Pt demo mobility below his reported baseline of independent at home without AD and still working full time. Pt demo improved mobility this am.  Pt required CGA for bed mobility, CGA for transfers/gait (with RW)  this session. Pt plans to return home at discharge and pt/wife now comfortable with patient returning home.  Anticipate pt will  continue to progress well as pain is controlled. If home, recommend 24 hour capable assist initially and  use of RW.  Treatment Diagnosis: mobility impairment due to hernia repair  Requires PT Follow-Up: Yes  Activity Tolerance  Activity Tolerance:  Patient limited by fatigue;Patient limited by endurance;Patient tolerated treatment well     Plan   Physical Therapy Plan  General Plan:  (2-5)  Current Treatment Recommendations: Functional mobility training, Transfer training, Endurance training, Gait training, Strengthening  Safety Devices  Type of Devices: Left in chair, Nurse notified, Chair alarm in place, Call light within reach     Restrictions  Position Activity Restriction  Other position/activity restrictions: up with assist     Subjective   Pain: rates incision site pain 3/10, RN aware  General  Chart Reviewed: Yes  Additional Pertinent Hx: 58 yo admitted 2/26/24 for INCISIONAL HERNA REPAIR WITH RETRORECTUS BIOSYNTHETIC MESH PLACEMENT AND DEBRIDEMET OF ABDOMINAL WALL MEASURING 23x9 CENTIMETERS. Pmhx: Crohn's disease with complication, High grade dysplasia in the area of ileocolonic anastomosis.  Family / Caregiver Present: Yes (wife)  Diagnosis: INCISIONAL HERNA REPAIR WITH RETRORECTUS BIOSYNTHETIC MESH PLACEMENT AND DEBRIDEMET OF ABDOMINAL WALL MEASURING 23x9 CENTIMETERS  Follows Commands: Within Functional Limits  Subjective  Subjective: Pt found supine in bed and agreeable to PT. \" I feel a little better today. \"         Social/Functional History  Social/Functional History  Lives With: Spouse (26 yo son living at home)  Type of Home: House  Home Layout: 1/2 bath on main level, Two level, Bed/Bath upstairs  Home Access:  (threshold through front)  Bathroom Shower/Tub: Walk-in shower  Bathroom Toilet: Standard  Home Equipment:  (no DME)  Has the patient had two or more falls in the past year or any fall with injury in the past year?: No  ADL Assistance: Independent  Ambulation Assistance: Independent  Transfer Assistance: Independent  Active : Yes  Occupation: Full time employment  Type of Occupation: teacher-tech director    Vision/Hearing  Vision  Vision: Impaired  Vision Exceptions: Wears glasses at all times  Hearing  Hearing: Within

## 2024-02-29 NOTE — PROGRESS NOTES
Pt is alert and oriented x4. VSS. Pt c/o abdominal pain 5/10 on 0-10 pain scale. Medicated with PRN pain medications x2. Wife at bedside. Contact assist with walker. Denies any nausea/vomiting. Encouraged to call out for assistance when needed. Will continue to monitor.

## 2024-02-29 NOTE — PROGRESS NOTES
Department of Surgery  Daily Progress Note    CC: high grade dysplasia of ileocolic anastomosis    Procedure(s) Performed: redo ileocecetomy, incisional hernia repair with retrorectus biosynthetic mesh placement and debridement of abdominal wall measuring 23x9 cm.     Subjective:   No acute events overnight. Tolerating diet without nausea or vomiting. Abdominal pain is stable. Had two bowel movements yesterday. Ambulating. Feels unsteady on feet. Would prefer to stay one more day.     Objective:  Exam:  Vitals:    02/28/24 2041 02/28/24 2112 02/29/24 0130 02/29/24 0211   BP:   (!) 141/84    Pulse:   94    Resp:  18 20 18   Temp:   99.4 °F (37.4 °C)    TempSrc:   Oral    SpO2: 92%  93%    Weight:       Height:           General appearance: alert, no acute distress  Chest/Lungs: No adventitious breath sounds. No increased work of breathing on RA  Cardiovascular: RRR  Abdomen: soft, appropriately tender, non-distended. Prevena with good seal. CAROL drain w/ ss output.   Skin: no erythema or rashes, no cyanosis  Extremities: no edema, no cyanosis  Neuro: A&Ox3, no focal deficits, sensation intact    Assessment and Plan  This is a 59 y.o. year old male with high grade dysplasia of ileocolic anastomosis s/p redo ileocecetomy, incisional hernia repair with retrorectus biosynthetic mesh placement and debridement of abdominal wall measuring 23x9 cm on 2/26    - Multimodal pain control  - Encourage IS use, OOB to chair and ambulation  - Diet: Regular  - Abx coverage with Ancef  - Dispo home tomorrow    Jian Pulido DO  PGY-1, General Surgery Resident  02/29/24 6:20 AM  [phone]

## 2024-02-29 NOTE — CARE COORDINATION
Patient is from home with spouse and will return to home at d/c. He has agreed to home care and [facility] is able to provide care. He needs a RW and BSC which is going to be delivered to the room today. His wife is able to transport to home at d/ and they would like Meds-beds. His wound vac is a prevena and will be switched to the smaller vac at d/ but no approval required.     Electronically signed by Estela Talbot RN on 2/29/2024 at 11:50 AM  [phone]

## 2024-02-29 NOTE — PLAN OF CARE
Problem: Discharge Planning  Goal: Discharge to home or other facility with appropriate resources  2/28/2024 2007 by Roxane John, RN  Outcome: Progressing  Flowsheets (Taken 2/28/2024 2007)  Discharge to home or other facility with appropriate resources:   Identify barriers to discharge with patient and caregiver   Arrange for needed discharge resources and transportation as appropriate  2/28/2024 2006 by Roxane John, RN  Outcome: Progressing  Flowsheets (Taken 2/28/2024 2006)  Discharge to home or other facility with appropriate resources:   Identify barriers to discharge with patient and caregiver   Arrange for needed discharge resources and transportation as appropriate     Problem: Pain  Goal: Verbalizes/displays adequate comfort level or baseline comfort level  Outcome: Progressing  Flowsheets (Taken 2/28/2024 2007)  Verbalizes/displays adequate comfort level or baseline comfort level:   Encourage patient to monitor pain and request assistance   Administer analgesics based on type and severity of pain and evaluate response   Assess pain using appropriate pain scale   Implement non-pharmacological measures as appropriate and evaluate response     Problem: Safety - Adult  Goal: Free from fall injury  Outcome: Progressing  Flowsheets (Taken 2/28/2024 2007)  Free From Fall Injury: Instruct family/caregiver on patient safety

## 2024-03-01 VITALS
HEART RATE: 73 BPM | HEIGHT: 67 IN | TEMPERATURE: 97.9 F | SYSTOLIC BLOOD PRESSURE: 152 MMHG | BODY MASS INDEX: 26.49 KG/M2 | OXYGEN SATURATION: 93 % | WEIGHT: 168.8 LBS | DIASTOLIC BLOOD PRESSURE: 97 MMHG | RESPIRATION RATE: 16 BRPM

## 2024-03-01 LAB
ALBUMIN SERPL-MCNC: 3 G/DL (ref 3.4–5)
ANION GAP SERPL CALCULATED.3IONS-SCNC: 9 MMOL/L (ref 3–16)
BASOPHILS # BLD: 0 K/UL (ref 0–0.2)
BASOPHILS NFR BLD: 0.5 %
BUN SERPL-MCNC: 11 MG/DL (ref 7–20)
CALCIUM SERPL-MCNC: 8 MG/DL (ref 8.3–10.6)
CHLORIDE SERPL-SCNC: 105 MMOL/L (ref 99–110)
CO2 SERPL-SCNC: 24 MMOL/L (ref 21–32)
CREAT SERPL-MCNC: 0.9 MG/DL (ref 0.9–1.3)
DEPRECATED RDW RBC AUTO: 14.9 % (ref 12.4–15.4)
EOSINOPHIL # BLD: 0.4 K/UL (ref 0–0.6)
EOSINOPHIL NFR BLD: 4.7 %
GFR SERPLBLD CREATININE-BSD FMLA CKD-EPI: >60 ML/MIN/{1.73_M2}
GLUCOSE SERPL-MCNC: 122 MG/DL (ref 70–99)
HCT VFR BLD AUTO: 33.8 % (ref 40.5–52.5)
HGB BLD-MCNC: 10.8 G/DL (ref 13.5–17.5)
LYMPHOCYTES # BLD: 1.4 K/UL (ref 1–5.1)
LYMPHOCYTES NFR BLD: 18.3 %
MAGNESIUM SERPL-MCNC: 1.8 MG/DL (ref 1.8–2.4)
MCH RBC QN AUTO: 32.7 PG (ref 26–34)
MCHC RBC AUTO-ENTMCNC: 32 G/DL (ref 31–36)
MCV RBC AUTO: 102.1 FL (ref 80–100)
MONOCYTES # BLD: 0.7 K/UL (ref 0–1.3)
MONOCYTES NFR BLD: 9.3 %
NEUTROPHILS # BLD: 5.3 K/UL (ref 1.7–7.7)
NEUTROPHILS NFR BLD: 67.2 %
PHOSPHATE SERPL-MCNC: 1.9 MG/DL (ref 2.5–4.9)
PLATELET # BLD AUTO: 230 K/UL (ref 135–450)
PMV BLD AUTO: 7.7 FL (ref 5–10.5)
POTASSIUM SERPL-SCNC: 3 MMOL/L (ref 3.5–5.1)
RBC # BLD AUTO: 3.31 M/UL (ref 4.2–5.9)
SODIUM SERPL-SCNC: 138 MMOL/L (ref 136–145)
WBC # BLD AUTO: 7.9 K/UL (ref 4–11)

## 2024-03-01 PROCEDURE — 6370000000 HC RX 637 (ALT 250 FOR IP): Performed by: STUDENT IN AN ORGANIZED HEALTH CARE EDUCATION/TRAINING PROGRAM

## 2024-03-01 PROCEDURE — 36415 COLL VENOUS BLD VENIPUNCTURE: CPT

## 2024-03-01 PROCEDURE — 6370000000 HC RX 637 (ALT 250 FOR IP): Performed by: NURSE PRACTITIONER

## 2024-03-01 PROCEDURE — 6370000000 HC RX 637 (ALT 250 FOR IP)

## 2024-03-01 PROCEDURE — 85025 COMPLETE CBC W/AUTO DIFF WBC: CPT

## 2024-03-01 PROCEDURE — 6360000002 HC RX W HCPCS: Performed by: SURGERY

## 2024-03-01 PROCEDURE — 80069 RENAL FUNCTION PANEL: CPT

## 2024-03-01 PROCEDURE — 99024 POSTOP FOLLOW-UP VISIT: CPT | Performed by: SURGERY

## 2024-03-01 PROCEDURE — 83735 ASSAY OF MAGNESIUM: CPT

## 2024-03-01 PROCEDURE — 6370000000 HC RX 637 (ALT 250 FOR IP): Performed by: SURGERY

## 2024-03-01 PROCEDURE — 2580000003 HC RX 258: Performed by: SURGERY

## 2024-03-01 RX ORDER — POTASSIUM CHLORIDE 20 MEQ/1
40 TABLET, EXTENDED RELEASE ORAL 2 TIMES DAILY WITH MEALS
Status: COMPLETED | OUTPATIENT
Start: 2024-03-01 | End: 2024-03-01

## 2024-03-01 RX ADMIN — SODIUM CHLORIDE, PRESERVATIVE FREE 10 ML: 5 INJECTION INTRAVENOUS at 08:24

## 2024-03-01 RX ADMIN — PANTOPRAZOLE SODIUM 40 MG: 40 TABLET, DELAYED RELEASE ORAL at 08:23

## 2024-03-01 RX ADMIN — ACETAMINOPHEN 1000 MG: 500 TABLET ORAL at 06:04

## 2024-03-01 RX ADMIN — OXYCODONE 5 MG: 5 TABLET ORAL at 01:09

## 2024-03-01 RX ADMIN — POTASSIUM CHLORIDE 40 MEQ: 1500 TABLET, EXTENDED RELEASE ORAL at 14:15

## 2024-03-01 RX ADMIN — METHOCARBAMOL 1000 MG: 500 TABLET ORAL at 08:23

## 2024-03-01 RX ADMIN — DIBASIC SODIUM PHOSPHATE, MONOBASIC POTASSIUM PHOSPHATE AND MONOBASIC SODIUM PHOSPHATE 2 TABLET: 852; 155; 130 TABLET ORAL at 14:15

## 2024-03-01 RX ADMIN — DIBASIC SODIUM PHOSPHATE, MONOBASIC POTASSIUM PHOSPHATE AND MONOBASIC SODIUM PHOSPHATE 2 TABLET: 852; 155; 130 TABLET ORAL at 10:55

## 2024-03-01 RX ADMIN — POTASSIUM CHLORIDE 40 MEQ: 1500 TABLET, EXTENDED RELEASE ORAL at 10:55

## 2024-03-01 RX ADMIN — METHOCARBAMOL 1000 MG: 500 TABLET ORAL at 14:14

## 2024-03-01 ASSESSMENT — PAIN DESCRIPTION - PAIN TYPE: TYPE: SURGICAL PAIN

## 2024-03-01 ASSESSMENT — PAIN DESCRIPTION - ORIENTATION: ORIENTATION: MID

## 2024-03-01 ASSESSMENT — PAIN DESCRIPTION - ONSET: ONSET: ON-GOING

## 2024-03-01 ASSESSMENT — PAIN DESCRIPTION - LOCATION: LOCATION: ABDOMEN

## 2024-03-01 ASSESSMENT — PAIN DESCRIPTION - FREQUENCY: FREQUENCY: CONTINUOUS

## 2024-03-01 ASSESSMENT — PAIN DESCRIPTION - DESCRIPTORS: DESCRIPTORS: SHARP

## 2024-03-01 ASSESSMENT — PAIN SCALES - GENERAL: PAINLEVEL_OUTOF10: 5

## 2024-03-01 ASSESSMENT — PAIN - FUNCTIONAL ASSESSMENT: PAIN_FUNCTIONAL_ASSESSMENT: ACTIVITIES ARE NOT PREVENTED

## 2024-03-01 NOTE — CARE COORDINATION
CTN contacted [facility] with [facility] [phone]. They have accepted this patient and will pull referral from [facility]. They will contact patient and make arrangements for SOC by 3/3  Electronically signed by Ilda Gould LPN on 3/1/2024 at 2:56 PM

## 2024-03-01 NOTE — CARE COORDINATION
Case Management Assessment            Discharge Note                    Date / Time of Note: 3/1/2024 3:28 PM                  Discharge Note Completed by: Estela Talbot RN    Patient Name: Richard Lyman   YOB: 1965  Diagnosis: Ileocolic anastomotic leak [K91.89]  Crohn's disease with complication, unspecified gastrointestinal tract location (HCC) [K50.919]  Hydronephrosis with renal and ureteral calculus obstruction [N13.2]  Incarcerated incisional hernia [K43.0]  Crohn's disease of colon with complication (HCC) [K50.119]   Date / Time: 2/26/2024  8:53 AM    Current PCP: Karine Cortes DO  Clinic patient: No    Hospitalization in the last 30 days: No       Advance Directives:  Code Status: Full Code  [place] DNR form completed and on chart: No    Financial:  Payor: BCBS / Plan: BCBS - [place] PPO / Product Type: *No Product type* /      Pharmacy:    [facility] [id number] [place] [address] [address] [facility] [phone] -  [phone]  [address] [facility] [address]  Phone: [phone] Fax: [phone]      Assistance purchasing medications?: Potential Assistance Purchasing Medications: No  Assistance provided by Case Management: None at this time    Does patient want to participate in local refill/ meds to beds program?: Yes    Meds To Beds General Rules:  1. Can ONLY be done Monday- Friday between 8:30am-5pm  2. Prescription(s) must be in pharmacy by 3pm to be filled same day  3.Copy of patient's insurance/ prescription drug card and patient face sheet must be sent along with the prescription(s)  4. Cost of Rx cannot be added to hospital bill. If financial assistance is needed, please contact unit  or ;  or  CANNOT provide pharmacy voucher for patients co-pays  5. Patients can then  the prescription on their way out of the hospital at discharge, or pharmacy can deliver to the bedside if  staff is available. (payment due at time of pick-up or delivery - cash, check, or card accepted)     Able to afford home medications/ co-pay costs: Yes    ADLS:  Current PT AM-PAC Score: 18 /24  Current OT AM-PAC Score: 19 /24      DISCHARGE Disposition: Home with Home Health Care: [facility]     LOC at discharge: Not Applicable  JENNIFER Completed: No    Notification completed in HENS/PAS?:  No    IMM Completed:   No         Transportation:  Transportation PLAN for discharge: family   Mode of Transport: Private Car  Reason for medical transport: Not Applicable  Name of Transport Company: Not Applicable  Time of Transport: TBD      Home Care:  Home Care ordered at discharge: Yes  Home Care Agency: [facility]  Phone: [phone]  Fax: [phone]  Orders faxed: Yes    Durable Medical Equipment:  DME Provider: [facility]  Equipment obtained during hospitalization: walker and bedside commode        Additional CM Notes: Patient cleared for d/c to home with spouse. Orders faxed to [facility] and all DME delivered.     COVID Result:  No results found for: \"COVID19\"    The Plan for Transition of Care is related to the following treatment goals of Ileocolic anastomotic leak [K91.89]  Crohn's disease with complication, unspecified gastrointestinal tract location (HCC) [K50.919]  Hydronephrosis with renal and ureteral calculus obstruction [N13.2]  Incarcerated incisional hernia [K43.0]  Crohn's disease of colon with complication (HCC) [K50.119]    The Patient and/or patient representative Richard and his family were provided with a choice of provider and agrees with the discharge plan Yes    Freedom of choice list was provided with basic dialogue that supports the patient's individualized plan of care/goals and shares the quality data associated with the providers. Yes    Care Transitions patient: No    Estela Talbot RN  [facility]  Case Management Department  Ph: [phone]  Fax:

## 2024-03-01 NOTE — PLAN OF CARE
Problem: Safety - Adult  Goal: Free from fall injury  3/1/2024 0034 by Anthony Marrufo RN  Outcome: Progressing  2/29/2024 1233 by Reji Smith RN  Outcome: Progressing  2/29/2024 1200 by Reji Smith RN  Outcome: Progressing     Problem: Pain  Goal: Verbalizes/displays adequate comfort level or baseline comfort level  3/1/2024 0034 by Anthony Marrufo RN  Outcome: Progressing  2/29/2024 1233 by Reji Smith RN  Outcome: Progressing  2/29/2024 1200 by Reji Smith RN  Outcome: Progressing

## 2024-03-01 NOTE — PROGRESS NOTES
Pt a&Ox4. VSS this shift. Pt denies nausea and vomiting. Pain is manageable as verbalized by pt. Wound vac in place, remains cdi, with good seal. CAROL drain in place with sanguineous output. Pt voiding adequately. Had BM x4, with loose, stool. Ambulated to hallway and tolerated well.     Electronically signed by Anthony Marrufo RN on 3/1/2024 at 8:07 AM

## 2024-03-01 NOTE — PROGRESS NOTES
Department of Bariatric Surgery  Daily Progress Note    CC: ileocolic anastamotic leak, incisional hernia     Subjective:   No acute events overnight. Afebrile and hemodynamically stable. Pain controlled ans well controlled. Tolerating diet without nausea or vomiting. Having bowel movements. Voiding.    Objective:  Output by Drain (mL) 02/28/24 0701 - 02/28/24 1900 02/28/24 1901 - 02/29/24 0700 02/29/24 0701 - 02/29/24 1900 02/29/24 1901 - 03/01/24 0624   Closed/Suction Drain Left Abdomen;LLQ Bulb 140 60 35 30      Exam:  Vitals:    02/29/24 1838 02/29/24 1940 02/29/24 2333 03/01/24 0324   BP:  (!) 146/88 (!) 163/93 (!) 155/92   Pulse:  82 81 84   Resp: 16 17 17 17   Temp:  99 °F (37.2 °C) 98 °F (36.7 °C) 98.2 °F (36.8 °C)   TempSrc:  Oral Oral Oral   SpO2:  94% 91% 91%   Weight:       Height:         General appearance: alert, no acute distress  Chest/Lungs: No adventitious breath sounds. No increased work of breathing.   Cardiovascular: RR  Abdomen: soft, appropriately tender, non-distended. Prevena with good seal. CAROL drain w/ SS output.   Skin: no erythema or rashes, no cyanosis  Extremities: no edema, no cyanosis  Neuro: A&Ox3, no focal deficits, sensation intact    Assessment and Plan  This is a 59 y.o. year old male s/p redo ileocecetomy, incisional hernia repair with retrorectus biosynthetic mesh placement and debridement of abdominal wall measuring 23x9 cm 2/26/2024 4 Days Post-Op      - Multimodal pain control  - Encourage IS use, OOB to chair and ambulation  - Monitoring CAROL output, will need drain teaching prior to discharge  - Dispo pending pain control and tolerance of diet  PT recommending additional therapy prior to discharge  Likely d/c today per primary    Jian Pulido DO  PGY-1, General Surgery Resident  03/01/24 6:24 AM  [phone]

## 2024-03-01 NOTE — PROGRESS NOTES
Department of Surgery  Daily Progress Note    CC: high grade dysplasia of ileocolic anastomosis    Procedure(s) Performed: redo ileocecetomy, incisional hernia repair with retrorectus biosynthetic mesh placement and debridement of abdominal wall measuring 23x9 cm.     Subjective:   No acute events overnight. Tolerating diet without nausea or vomiting. Abdominal pain is stable. Having bowel function. Ambulating. Feels ready to go home.    Objective:  Exam:  Vitals:    02/29/24 1838 02/29/24 1940 02/29/24 2333 03/01/24 0324   BP:  (!) 146/88 (!) 163/93 (!) 155/92   Pulse:  82 81 84   Resp: 16 17 17 17   Temp:  99 °F (37.2 °C) 98 °F (36.7 °C) 98.2 °F (36.8 °C)   TempSrc:  Oral Oral Oral   SpO2:  94% 91% 91%   Weight:       Height:           General appearance: alert, no acute distress  Chest/Lungs: No adventitious breath sounds. No increased work of breathing on RA  Cardiovascular: RRR  Abdomen: soft, appropriately tender, non-distended. Prevena with good seal. CAROL drain w/ ss output.   Skin: no erythema or rashes, no cyanosis  Extremities: no edema, no cyanosis  Neuro: A&Ox3, no focal deficits, sensation intact    Assessment and Plan  This is a 59 y.o. year old male with high grade dysplasia of ileocolic anastomosis s/p redo ileocecetomy, incisional hernia repair with retrorectus biosynthetic mesh placement and debridement of abdominal wall measuring 23x9 cm on 2/26    -regular diet  -oral pain control   -dispo: home today with prevena and drain  -4 weeks of lovenox at discharge    Camille Scott MD  PGY1, General Surgery  03/01/24   5:59 AM   [employer]  [phone]

## 2024-03-03 PROBLEM — K43.0 INCARCERATED INCISIONAL HERNIA: Status: ACTIVE | Noted: 2024-03-03

## 2024-03-03 NOTE — OP NOTE
diameter of the hernia defects was measured to be approximately 12.5 cm in total diameter by approximately 5 cm in width.    Attention was then paid to creation of the first myofascial flap on the patient's left side.  Incision was made in the posterior sheath to bring the posterior sheath down and enter the retrorectus plane.  The dissection on the left side was undertaken by taking this plane all the way lateral to the lateral border of the rectus muscle until the neurovascular bundle was found.  This dissection was taken far above and well below the hernia defects to create widening of the rectus muscle as well as bringing the rectus muscle and advancing it to linea alba.  The dissection was taken all the way to the linea semilunaris and once this was completed, this concluded our first left-sided myofascial release.  MYOFASCIAL FLAP #1    Attention was then paid to the right side and similarly, dissection was begun by incising the posterior sheath on the medial border of the rectus muscle.  Once this fibroareolar plane was found, it was developed all the way to the lateral border of the rectus muscle sparing the neurovascular bundle all the way to the linea semilunaris.  This dissection was taken far above and well below the hernia defects to match the left-sided dissection.  This again accomplished widening of the rectus muscle and advancement of the muscle to the midline.  This concluded our second right-sided myofascial release.  MYOFASCIAL FLAP #2    At that point, the posterior sheath was closed after a sponge, needle, and instrument count was correct.  This was accomplished using 0 Vicryl suture in a running fashion, and the posterior sheath was completely brought to the midline to approximate the space and create space for a piece of bioresorbable mesh.  Once this was accomplished, antibiotic irrigation was performed of this plane and a 20 x 16 cm Enform biosynthetic mesh was placed in antibiotic solution

## 2024-03-05 ENCOUNTER — TELEPHONE (OUTPATIENT)
Dept: SURGERY | Age: 59
End: 2024-03-05

## 2024-03-05 NOTE — TELEPHONE ENCOUNTER
Ania, from [employer] [facility], called wanting to report the patient's elevated blood pressure. His BP is 142/100     Ania's phone number for any questions [phone]

## 2024-03-06 ENCOUNTER — OFFICE VISIT (OUTPATIENT)
Dept: SURGERY | Age: 59
End: 2024-03-06

## 2024-03-06 ENCOUNTER — OFFICE VISIT (OUTPATIENT)
Dept: BARIATRICS/WEIGHT MGMT | Age: 59
End: 2024-03-06

## 2024-03-06 VITALS
HEART RATE: 69 BPM | HEIGHT: 67 IN | DIASTOLIC BLOOD PRESSURE: 94 MMHG | BODY MASS INDEX: 27.15 KG/M2 | WEIGHT: 173 LBS | SYSTOLIC BLOOD PRESSURE: 150 MMHG

## 2024-03-06 DIAGNOSIS — K43.2 INCISIONAL HERNIA, WITHOUT OBSTRUCTION OR GANGRENE: ICD-10-CM

## 2024-03-06 DIAGNOSIS — K50.818 CROHN'S DISEASE OF BOTH SMALL AND LARGE INTESTINE WITH OTHER COMPLICATION (HCC): Primary | ICD-10-CM

## 2024-03-06 DIAGNOSIS — K43.0 INCISIONAL HERNIA, INCARCERATED: Primary | ICD-10-CM

## 2024-03-06 PROCEDURE — 99024 POSTOP FOLLOW-UP VISIT: CPT | Performed by: SURGERY

## 2024-03-06 NOTE — ANESTHESIA POSTPROCEDURE EVALUATION
Department of Anesthesiology  Postprocedure Note    Patient: Richard Lyman  MRN: [id number]  YOB: 1965  Date of evaluation: 3/5/2024    Procedure Summary     Date: 02/26/24 Room / Location: [place] [id number] / [facility]    Anesthesia Start: 1056 Anesthesia Stop: 1618    Procedures:       REDO ILEOCECECTOMY      .      INCISIONAL HERNA REPAIR WITH RETRORECTUS BIOSYNTHETIC MESH PLACEMENT AND DEBRIDEMET OF ABDOMINAL WALL MEASURING 23x9 CENTIMETERS (Abdomen) Diagnosis:       Ileocolic anastomotic leak      Crohn's disease with complication, unspecified gastrointestinal tract location (HCC)      Hydronephrosis with renal and ureteral calculus obstruction      Incarcerated incisional hernia      (Ileocolic anastomotic leak [K91.89])      (Crohn's disease with complication, unspecified gastrointestinal tract location (HCC) [K50.919])      (Hydronephrosis with renal and ureteral calculus obstruction [N13.2])      (Incarcerated incisional hernia [K43.0])    Surgeons: Mathieu Weiner MD; Cyrus Samano DO Responsible Provider: Rosalba Gomez MD    Anesthesia Type: general ASA Status: 2          Anesthesia Type: No value filed.    Goldie Phase I: Goldie Score: 7    Goldie Phase II:      Anesthesia Post Evaluation    Patient location during evaluation: PACU  Patient participation: complete - patient participated  Level of consciousness: awake  Pain score: 3  Airway patency: patent  Nausea & Vomiting: no nausea and no vomiting  Cardiovascular status: hemodynamically stable  Respiratory status: acceptable  Hydration status: euvolemic  Pain management: adequate        No notable events documented.

## 2024-03-06 NOTE — PROGRESS NOTES
[facility] [place] [facility] COLORECTAL SURGERY  [address] [place] [address]  [address] [place] [address]  Dept: [phone]  Dept Fax: [phone]  Loc: [phone]    Visit Date: 3/6/2024    Richard Lyman is a 59 y.o. male who presents today for: No chief complaint on file.      Subjective:     Richard Lyman is a 59 y.o. male here for postoperative visit after ileocecectomy with hernia repair 2 wks. Bowel working. Eating OK. Pain controlled.    Patient's problem list, medications, past medical, surgical, family, and social histories were reviewed and updated in the chart as indicated today.    Objective:     There were no vitals taken for this visit.    Abdominal/wound: vac in place    Assessment/Plan:       ASSESSMENT/PLAN:    Overall doing well.     DISPOSITION:  f/u with Dr Samano for wound care    Note completed using dictation software, please excuse any errors.    Referring/primary care physician updated through [facility] note if PCP was listed.    Electronically signed by Mathieu Weiner MD on 3/6/2024 at 12:47 PM

## 2024-03-06 NOTE — TELEPHONE ENCOUNTER
LISETH Jorge from [facility] informing her to contact patient's PCP for elevated BP and that Dr. Weiner is seeing patient today for post-op visit. She can call back if she has questions.

## 2024-03-06 NOTE — PROGRESS NOTES
A/P  Continue lifting restrictions 4 more weeks.    F/U next week to have drain removed.     INicole MA am scribing for and in the presence of Dr. Selwyn DO on this date of 03/06/24 at 3:29 PM    ICyrus DO personally performed the services described in this documentation as scribed by the Medical Assistant Nicole Albarran in my presence and it is both accurate and complete. 03/14/24

## 2024-03-14 ENCOUNTER — OFFICE VISIT (OUTPATIENT)
Dept: BARIATRICS/WEIGHT MGMT | Age: 59
End: 2024-03-14

## 2024-03-14 VITALS
DIASTOLIC BLOOD PRESSURE: 94 MMHG | HEIGHT: 67 IN | BODY MASS INDEX: 26.53 KG/M2 | SYSTOLIC BLOOD PRESSURE: 150 MMHG | HEART RATE: 96 BPM | WEIGHT: 169 LBS

## 2024-03-14 DIAGNOSIS — K43.0 INCISIONAL HERNIA, INCARCERATED: Primary | ICD-10-CM

## 2024-03-14 PROCEDURE — 99024 POSTOP FOLLOW-UP VISIT: CPT | Performed by: SURGERY

## 2024-03-14 NOTE — PROGRESS NOTES
[facility]   General & Laparoscopic Surgery  [employer]       HPI:    Richard Lyman is very pleasant 59 y.o. male who is s/p incisional hernia repair done by me on 2/26/24.    Patient comes to the clinic for follow up care after procedure and reports he is doing very well, tolerating diet and having regular bowel movements.   Patient denies any nausea, vomiting, fevers, chills, shortness of breath, chest pain, cough, constipation or difficulty urinating.    Patient had wound vac and retrorectus drain placed at the time of the operation, drain has had minimum output.     Past Medical History:   Diagnosis Date    Acute Crohn's disease (HCC)     Anemia     History of kidney stones      Past Surgical History:   Procedure Laterality Date    BOWEL RESECTION      2 surgeries    CHOLECYSTECTOMY      15+yrs ago    HEMICOLECTOMY N/A 2/26/2024    REDO ILEOCECECTOMY performed by Mathieu Weiner MD at [facility] OR    HEMICOLECTOMY N/A 2/26/2024    . performed by Mathieu Weiner MD at [facility] OR    LITHOTRIPSY      UMBILICAL HERNIA REPAIR N/A 2/26/2024    INCISIONAL HERNA REPAIR WITH RETRORECTUS BIOSYNTHETIC MESH PLACEMENT AND DEBRIDEMET OF ABDOMINAL WALL MEASURING 23x9 CENTIMETERS performed by Cyrus Samano DO at [facility] OR     No family history on file.  Social History     Tobacco Use    Smoking status: Never    Smokeless tobacco: Never   Substance Use Topics    Alcohol use: Yes     Alcohol/week: 1.0 standard drink of alcohol     Types: 1 Glasses of wine per week     Comment: social     I counseled the patient on the importance of not smoking and risks of ETOH.   Allergies   Allergen Reactions    Sulfa Antibiotics Hives    Tetracyclines & Related Hives     Vitals:    03/14/24 0840   BP: (!) 150/94   Pulse: 96   Weight: 76.7 kg (169 lb)   Height: 1.702 m (5' 7\")       Body mass index is 26.47 kg/m².      Current Outpatient Medications:     enoxaparin (LOVENOX) 40 MG/0.4ML, Inject 0.4 mLs into the skin daily

## 2024-04-11 ENCOUNTER — OFFICE VISIT (OUTPATIENT)
Dept: BARIATRICS/WEIGHT MGMT | Age: 59
End: 2024-04-11

## 2024-04-11 VITALS
DIASTOLIC BLOOD PRESSURE: 99 MMHG | BODY MASS INDEX: 27 KG/M2 | HEIGHT: 67 IN | SYSTOLIC BLOOD PRESSURE: 150 MMHG | WEIGHT: 172 LBS | HEART RATE: 103 BPM

## 2024-04-11 DIAGNOSIS — K43.0 INCISIONAL HERNIA, INCARCERATED: Primary | ICD-10-CM

## 2024-04-11 PROCEDURE — 99024 POSTOP FOLLOW-UP VISIT: CPT | Performed by: SURGERY

## 2024-04-11 NOTE — PROGRESS NOTES
[facility]   General & Laparoscopic Surgery  [employer]       HPI:    Richard Lyman is very pleasant 59 y.o. male who is s/p incisional hernia repair done by me on 2/26/2024.     Patient comes to the clinic for follow up care after procedure and reports he is doing very well, tolerating diet, having bowel movements.   Patient denies nausea, vomiting, fever, chills, or constipation.     Past Medical History:   Diagnosis Date    Acute Crohn's disease (HCC)     Anemia     History of kidney stones      Past Surgical History:   Procedure Laterality Date    BOWEL RESECTION      2 surgeries    CHOLECYSTECTOMY      15+yrs ago    HEMICOLECTOMY N/A 2/26/2024    REDO ILEOCECECTOMY performed by Mathieu Weiner MD at [facility] OR    HEMICOLECTOMY N/A 2/26/2024    . performed by Mathieu Weiner MD at [facility] OR    LITHOTRIPSY      UMBILICAL HERNIA REPAIR N/A 2/26/2024    INCISIONAL HERNA REPAIR WITH RETRORECTUS BIOSYNTHETIC MESH PLACEMENT AND DEBRIDEMET OF ABDOMINAL WALL MEASURING 23x9 CENTIMETERS performed by Cyrus Samano DO at [facility] OR     No family history on file.  Social History     Tobacco Use    Smoking status: Never    Smokeless tobacco: Never   Substance Use Topics    Alcohol use: Yes     Alcohol/week: 1.0 standard drink of alcohol     Types: 1 Glasses of wine per week     Comment: social     I counseled the patient on the importance of not smoking and risks of ETOH.   Allergies   Allergen Reactions    Sulfa Antibiotics Hives    Tetracyclines & Related Hives     Vitals:    04/11/24 0810 04/11/24 0837   BP: (!) 152/97 (!) 150/99   Pulse: (!) 103    Weight: 78 kg (172 lb)    Height: 1.702 m (5' 7\")        Body mass index is 26.94 kg/m².      Current Outpatient Medications:     PARoxetine (PAXIL) 10 MG tablet, Take 1 tablet by mouth nightly, Disp: , Rfl:     Colesevelam HCl (WELCHOL PO), Take by mouth PRN, Disp: , Rfl:       Review of Systems - History obtained from the patient  General ROS:

## (undated) DEVICE — 40583 XL ADVANCED TRENDELENBURG POSITIONING KIT: Brand: 40583 XL ADVANCED TRENDELENBURG POSITIONING KIT

## (undated) DEVICE — RELOAD STPL L75MM OPN H3.8MM CLS 1.5MM WIRE DIA0.2MM REG

## (undated) DEVICE — PROGRASP FORCEPS: Brand: ENDOWRIST

## (undated) DEVICE — PUMP SUC IRR TBNG L10FT W/ HNDPC ASSEMB STRYKEFLOW 2

## (undated) DEVICE — DRAPE,LAP,CHOLE,W/TROUGHS,STERILE: Brand: MEDLINE

## (undated) DEVICE — SUTURE VCRL SZ 0 L27IN ABSRB UD L36MM CT-1 1/2 CIR J260H

## (undated) DEVICE — GLOVE SURG SZ 75 L12IN THK75MIL DK GRN LTX FREE

## (undated) DEVICE — TUBING IRRIG L77IN DIA0.241IN L BOR FOR CYSTO W/ NVENT

## (undated) DEVICE — CYSTO: Brand: MEDLINE INDUSTRIES, INC.

## (undated) DEVICE — SYSTEM SMK EVAC LAP TBNG FILTER HSNG BENT STYL PNK SEE CLR

## (undated) DEVICE — SUTURE CTD ANTBCTRL 54 IN TI VCRL PLU VLT

## (undated) DEVICE — DRAIN SURG 19FR 100% SIL RADPQ RND CHN FULL FLUT

## (undated) DEVICE — PREMIUM WET SKIN PREP TRAY: Brand: MEDLINE INDUSTRIES, INC.

## (undated) DEVICE — 1LYRTR 16FR10ML100%SIL UMS SNP: Brand: MEDLINE INDUSTRIES, INC.

## (undated) DEVICE — BLADELESS OBTURATOR: Brand: WECK VISTA

## (undated) DEVICE — GLOVE ORANGE PI 7 1/2   MSG9075

## (undated) DEVICE — STAPLER INT L75MM CUT LN L73MM STPL LN L77MM BLU B FRM 8

## (undated) DEVICE — SPONGE,LAP,18"X18",DLX,XR,ST,5/PK,40/PK: Brand: MEDLINE

## (undated) DEVICE — GENERAL: Brand: MEDLINE INDUSTRIES, INC.

## (undated) DEVICE — SOLUTION IRRIG 3000ML STRL H2O USP UROMATIC PLAS CONT

## (undated) DEVICE — SOLUTION INJ LR VISIV 1000ML BG

## (undated) DEVICE — SPONGE,LAP,4"X18",XR,ST,5/PK,40PK/CS: Brand: MEDLINE INDUSTRIES, INC.

## (undated) DEVICE — SUTURE VCRL + SZ 2-0 L27IN ABSRB CLR CT-1 1/2 CIR TAPERCUT VCP259H

## (undated) DEVICE — LIQUIBAND RAPID ADHESIVE 36/CS 0.8ML: Brand: MEDLINE

## (undated) DEVICE — BAG,DRAINAGE,UROLOGY,2000ML,ANTIREFLUX,E: Brand: MEDLINE INDUSTRIES, INC.

## (undated) DEVICE — NEEDLE,22GX1.5",REG,BEVEL: Brand: MEDLINE

## (undated) DEVICE — CLEANER,CAUTERY TIP,2X2",STERILE: Brand: MEDLINE

## (undated) DEVICE — INSUFFLATION NEEDLE TO ESTABLISH PNEUMOPERITONEUM.: Brand: INSUFFLATION NEEDLE

## (undated) DEVICE — SCISSORS SURG DIA8MM MPLR CRV ENDOWRIST

## (undated) DEVICE — AMPLATZ ULTRA STIFF WIRE GUIDE: Brand: AMPLATZ

## (undated) DEVICE — Z DISC USE 2764362 SEAL ENDOSCP INSTR DIA5-8MM UNIV FOR CANN DA VINCI XI

## (undated) DEVICE — SEALER/DIVIDER LAP SHFT L44CM JAW APER 11.4MM 315DEG ROT

## (undated) DEVICE — SYRINGE MED 10ML LUERLOCK TIP W/O SFTY DISP

## (undated) DEVICE — TROCAR: Brand: KII OPTICAL ACCESS SYSTEM

## (undated) DEVICE — SUTURE VCRL SZ 4-0 L18IN ABSRB UD L19MM PS-2 3/8 CIR PRIM J496H

## (undated) DEVICE — BLADE ES ELASTOMERIC COAT INSUL DURABLE BEND UPTO 90DEG

## (undated) DEVICE — RESERVOIR,SUCTION,100CC,SILICONE: Brand: MEDLINE

## (undated) DEVICE — LAPAROSCOPIC ACCESS SYSTEM: Brand: ALEXIS LAPAROSCOPIC SYSTEM WITH KII FIOS FIRST ENTRY

## (undated) DEVICE — SUTURE PERMAHAND SZ 2-0 L12X18IN NONABSORBABLE BLK SILK A185H

## (undated) DEVICE — STAPLER SKIN H3.9MM WIRE DIA0.58MM CRWN 6.9MM 35 STPL ROT

## (undated) DEVICE — SCANLAN® SURG-I-PAW® INSTRUMENT COVERS - BLUE, 3/10" X 5"/ 8 MMX13 CM (2 - 5" PCS /PKG): Brand: SCANLAN® SURG-I-PAW® INSTRUMENT COVERS

## (undated) DEVICE — COVER,MAYO STAND,XL,STERILE: Brand: MEDLINE

## (undated) DEVICE — TROCAR: Brand: KII FIOS FIRST ENTRY

## (undated) DEVICE — SYSTEM EVAC SMOKE LAPARSCOPIC

## (undated) DEVICE — CONNECTOR CATH URET SIDE PRT FOR FRIC CONN UCA595] GU TEK]

## (undated) DEVICE — SOLUTION ANTIFOG VIS SYS CLEARIFY LAPSCP

## (undated) DEVICE — ROBOTIC: Brand: MEDLINE INDUSTRIES, INC.

## (undated) DEVICE — OPEN-END FLEXI-TIP URETERAL CATHETER: Brand: FLEXI-TIP

## (undated) DEVICE — SYRINGE MED 10ML TRNSLUC BRL PLUNG BLK MRK POLYPR CTRL

## (undated) DEVICE — 3M™ IOBAN™ 2 ANTIMICROBIAL INCISE DRAPE 6648EZ: Brand: IOBAN™ 2

## (undated) DEVICE — APPLICATOR MEDICATED 26 CC SOLUTION HI LT ORNG CHLORAPREP

## (undated) DEVICE — GLOVE SURG SZ 7 CRM LTX FREE POLYISOPRENE POLYMER BEAD ANTI

## (undated) DEVICE — GLOVE ORANGE PI 7   MSG9070

## (undated) DEVICE — SUTURE VCRL + SZ 0 L27IN ABSRB WHT CT-2 1/2 CIR TAPERPOINT VCP270H

## (undated) DEVICE — BLADELESS OBTURATOR, LONG: Brand: WECK VISTA

## (undated) DEVICE — TIP COVER ACCESSORY

## (undated) DEVICE — TOWEL,STOP FLAG GOLD N-W: Brand: MEDLINE

## (undated) DEVICE — SUTURE MCRYL + SZ 4-0 L27IN ABSRB UD L19MM PS-2 3/8 CIR MCP426H

## (undated) DEVICE — ARM DRAPE

## (undated) DEVICE — GUIDEWIRE ENDOSCP L150CM DIA0.035IN TIP 3CM PTFE NIT

## (undated) DEVICE — SEALER ENDOSCP NANO COAT OPN DIV CRV L JAW LIGASURE IMPACT

## (undated) DEVICE — SUTURE VCRL + SZ 3-0 L18IN ABSRB UD SH 1/2 CIR TAPERCUT NDL VCP864D

## (undated) DEVICE — SEALANT TISS 10 CC FOR HUM FIBRIN VISTASEAL